# Patient Record
Sex: MALE | Race: BLACK OR AFRICAN AMERICAN | Employment: OTHER | URBAN - METROPOLITAN AREA
[De-identification: names, ages, dates, MRNs, and addresses within clinical notes are randomized per-mention and may not be internally consistent; named-entity substitution may affect disease eponyms.]

---

## 2020-11-16 ENCOUNTER — HOSPITAL ENCOUNTER (EMERGENCY)
Age: 65
Discharge: HOME | End: 2020-11-16
Payer: MEDICARE

## 2020-11-16 ENCOUNTER — APPOINTMENT (OUTPATIENT)
Dept: GENERAL RADIOLOGY | Age: 65
End: 2020-11-16
Attending: FAMILY MEDICINE
Payer: MEDICARE

## 2020-11-16 VITALS
SYSTOLIC BLOOD PRESSURE: 134 MMHG | HEART RATE: 65 BPM | OXYGEN SATURATION: 100 % | TEMPERATURE: 98.7 F | RESPIRATION RATE: 16 BRPM | BODY MASS INDEX: 22.4 KG/M2 | WEIGHT: 160 LBS | HEIGHT: 71 IN | DIASTOLIC BLOOD PRESSURE: 81 MMHG

## 2020-11-16 DIAGNOSIS — S93.602A FOOT SPRAIN, LEFT, INITIAL ENCOUNTER: Primary | ICD-10-CM

## 2020-11-16 PROCEDURE — 99283 EMERGENCY DEPT VISIT LOW MDM: CPT

## 2020-11-16 PROCEDURE — 73630 X-RAY EXAM OF FOOT: CPT

## 2020-11-16 RX ORDER — MELOXICAM 7.5 MG/1
7.5 TABLET ORAL DAILY
Qty: 14 TAB | Refills: 0 | Status: SHIPPED | OUTPATIENT
Start: 2020-11-16 | End: 2020-11-30

## 2020-11-16 NOTE — ED TRIAGE NOTES
[facility] pt stated that he stepped down a step hard and felt some discomfort yesterday; pt stated that he went about his day and today his left foot is having increasing pain

## 2020-11-16 NOTE — ED PROVIDER NOTES
EMERGENCY DEPARTMENT HISTORY AND PHYSICAL EXAM      Date: 11/16/2020  Patient Name: Clem Israel    History of Presenting Illness     Chief Complaint   Patient presents with    Foot Pain       History Provided By: Patient    HPI: Clem sIrael, 72 y.o. male with a past medical history significant for arthritis presents to the ED with cc of sudden onset, gradually worsening, intermittent L foot pain x 2-3 days. Symptoms exacerbated with ambulation and alleviated with ibuprofen. He stepped on a step and felt as if his left foot inverted causing symptoms. He just wants to make sure there is no acute fracture. Works at [employer] and worked all day today. Has been using crutches intermittently for symptomatic relief. No other injuries noted. No overt fall. Has history of arthritis. Denies tobacco, alcohol, illicit drug use. No history of diabetes. Patient denies fever, chills, chest pain, shortness of breath, nausea, vomiting, diarrhea, numbness, tingling, weakness. There are no other complaints, changes, or physical findings at this time. PCP: None    No current facility-administered medications on file prior to encounter. No current outpatient medications on file prior to encounter. Past History     Past Medical History:  No past medical history on file. Past Surgical History:  No past surgical history on file. Family History:  No family history on file. Social History:  Social History     Tobacco Use    Smoking status: Not on file   Substance Use Topics    Alcohol use: Not on file    Drug use: Not on file       Allergies:  No Known Allergies      Review of Systems     Review of Systems   Constitutional: Negative for chills, fatigue and fever. HENT: Negative. Respiratory: Negative for cough, chest tightness, shortness of breath and wheezing. Cardiovascular: Negative for chest pain and palpitations.    Gastrointestinal: Negative for abdominal pain, diarrhea, nausea and vomiting. Genitourinary: Negative for frequency and urgency. Musculoskeletal: Positive for arthralgias (L foot). Negative for back pain, neck pain and neck stiffness. Skin: Negative for rash. Neurological: Negative for dizziness, weakness, light-headedness and headaches. Psychiatric/Behavioral: Negative. All other systems reviewed and are negative. Physical Exam     Physical Exam  Vitals signs and nursing note reviewed. Constitutional:       General: He is not in acute distress. Appearance: Normal appearance. He is not ill-appearing or toxic-appearing. HENT:      Head: Normocephalic and atraumatic. Nose: Nose normal.      Mouth/Throat:      Mouth: Mucous membranes are moist.   Eyes:      General: Lids are normal.      Conjunctiva/sclera:      Right eye: Right conjunctiva is not injected. Left eye: Left conjunctiva is not injected. Neck:      Musculoskeletal: Normal range of motion and neck supple. Cardiovascular:      Rate and Rhythm: Normal rate and regular rhythm. Heart sounds: No murmur. No friction rub. No gallop. Pulmonary:      Effort: Pulmonary effort is normal. No tachypnea or respiratory distress. Breath sounds: Normal breath sounds. No stridor or decreased air movement. Musculoskeletal: Normal range of motion. General: No swelling, tenderness, deformity or signs of injury. Right lower leg: No edema. Left lower leg: No edema. Comments: L foot: Tenderness to lateral aspect of L foot. Flexion/extension/inversion/eversion intact without tenderness, 2+ DP pulse BL, NVI, capillary refill < 3 seconds, sensation intact distally   Skin:     General: Skin is warm. Capillary Refill: Capillary refill takes less than 2 seconds. Neurological:      General: No focal deficit present. Mental Status: He is alert and oriented to person, place, and time. Mental status is at baseline.    Psychiatric:         Mood and Affect: Mood normal. Behavior: Behavior is cooperative. Thought Content: Thought content normal.         Judgment: Judgment normal.         Lab and Diagnostic Study Results     Labs -   No results found for this or any previous visit (from the past 12 hour(s)). Radiologic Studies -   XR Results (most recent):  Results from Hospital Encounter encounter on 11/16/20   XR FOOT LT MIN 3 V    Narrative Left foot, 3 views    No plain film evidence for fracture or dislocation. No erosion. Medical Decision Making   - I am the first provider for this patient. - I reviewed the vital signs, available nursing notes, past medical history, past surgical history, family history and social history. - Initial assessment performed. The patients presenting problems have been discussed, and they are in agreement with the care plan formulated and outlined with them. I have encouraged them to ask questions as they arise throughout their visit. Vital Signs-Reviewed the patient's vital signs. Patient Vitals for the past 12 hrs:   Temp Pulse Resp BP SpO2   11/16/20 1705 98.7 °F (37.1 °C) 65 16 134/81 100 %       Records Reviewed: Nursing Notes and Old Medical Records    The patient presents with L foot pain with a differential diagnosis of fracture, sprain, contusion      ED Course:          Provider Notes (Medical Decision Making):     MDM  Number of Diagnoses or Management Options  Foot sprain, left, initial encounter:   Diagnosis management comments:     51-year-old with left foot injury. X-rays are unremarkable. Patient neurovascularly intact. Has been using crutches on his own. Will provide with ankle stirrup for stability and symptomatic relief. We will also prescribe meloxicam given patient has history of arthritis as well. Advise he not take meloxicam with other NSAIDs which he conveyed understanding. Offered work note which he declined.        Amount and/or Complexity of Data Reviewed  Tests in the radiology section of CPT®: ordered and reviewed  Review and summarize past medical records: yes    Patient Progress  Patient progress: stable           Disposition   Disposition: DC- Adult Discharges: All of the diagnostic tests were reviewed and questions answered. Diagnosis, care plan and treatment options were discussed. The patient understands the instructions and will follow up as directed. The patients results have been reviewed with them. They have been counseled regarding their diagnosis. The patient verbally convey understanding and agreement of the signs, symptoms, diagnosis, treatment and prognosis and additionally agrees to follow up as recommended with their PCP in 24 - 48 hours. They also agree with the care-plan and convey that all of their questions have been answered. I have also put together some discharge instructions for them that include: 1) educational information regarding their diagnosis, 2) how to care for their diagnosis at home, as well a 3) list of reasons why they would want to return to the ED prior to their follow-up appointment, should their condition change. Discharged    DISCHARGE PLAN:  1. There are no discharge medications for this patient. 2.   Follow-up Information     Follow up With Specialties Details Why Contact Info    Primary Care Provider  In 3 days As needed     [facility] EMERGENCY DEPT Emergency Medicine  As needed, If symptoms worsen [address] [id number]  [phone]        3. Return to ED if worse   4. Rx for Meloxicam 7.5mg every day x 14 days    Diagnosis     Clinical Impression:   1. Foot sprain, left, initial encounter        Attestations:    JOSEMANUEL Hull    Please note that this dictation was completed with [employer], the computer voice recognition software. Quite often unanticipated grammatical, syntax, homophones, and other interpretive errors are inadvertently transcribed by the computer software. Please disregard these errors. Please excuse any errors that have escaped final proofreading. Thank you.

## 2023-12-10 ENCOUNTER — APPOINTMENT (OUTPATIENT)
Facility: HOSPITAL | Age: 68
End: 2023-12-10
Payer: MEDICARE

## 2023-12-10 ENCOUNTER — HOSPITAL ENCOUNTER (EMERGENCY)
Facility: HOSPITAL | Age: 68
Discharge: HOME | End: 2023-12-10
Payer: MEDICARE

## 2023-12-10 VITALS
DIASTOLIC BLOOD PRESSURE: 74 MMHG | WEIGHT: 160 LBS | HEIGHT: 70 IN | SYSTOLIC BLOOD PRESSURE: 121 MMHG | TEMPERATURE: 98.3 F | HEART RATE: 61 BPM | OXYGEN SATURATION: 96 % | RESPIRATION RATE: 16 BRPM | BODY MASS INDEX: 22.9 KG/M2

## 2023-12-10 DIAGNOSIS — G89.29 ACUTE EXACERBATION OF CHRONIC LOW BACK PAIN: Primary | ICD-10-CM

## 2023-12-10 DIAGNOSIS — M54.50 ACUTE EXACERBATION OF CHRONIC LOW BACK PAIN: Primary | ICD-10-CM

## 2023-12-10 PROCEDURE — 72110 X-RAY EXAM L-2 SPINE 4/>VWS: CPT

## 2023-12-10 PROCEDURE — 6360000002 HC RX W HCPCS: Performed by: NURSE PRACTITIONER

## 2023-12-10 PROCEDURE — 6370000000 HC RX 637 (ALT 250 FOR IP): Performed by: NURSE PRACTITIONER

## 2023-12-10 PROCEDURE — 72072 X-RAY EXAM THORAC SPINE 3VWS: CPT

## 2023-12-10 PROCEDURE — 96372 THER/PROPH/DIAG INJ SC/IM: CPT

## 2023-12-10 PROCEDURE — 99284 EMERGENCY DEPT VISIT MOD MDM: CPT

## 2023-12-10 RX ORDER — METHOCARBAMOL 750 MG/1
750 TABLET, FILM COATED ORAL 4 TIMES DAILY
Qty: 20 TABLET | Refills: 0 | Status: SHIPPED | OUTPATIENT
Start: 2023-12-10

## 2023-12-10 RX ORDER — METHOCARBAMOL 500 MG/1
750 TABLET, FILM COATED ORAL ONCE
Status: COMPLETED | OUTPATIENT
Start: 2023-12-10 | End: 2023-12-10

## 2023-12-10 RX ORDER — IBUPROFEN 600 MG/1
600 TABLET ORAL 3 TIMES DAILY PRN
Qty: 30 TABLET | Refills: 0 | Status: SHIPPED | OUTPATIENT
Start: 2023-12-10

## 2023-12-10 RX ORDER — KETOROLAC TROMETHAMINE 30 MG/ML
30 INJECTION, SOLUTION INTRAMUSCULAR; INTRAVENOUS ONCE
Status: COMPLETED | OUTPATIENT
Start: 2023-12-10 | End: 2023-12-10

## 2023-12-10 RX ADMIN — METHOCARBAMOL 750 MG: 500 TABLET ORAL at 13:23

## 2023-12-10 RX ADMIN — KETOROLAC TROMETHAMINE 30 MG: 30 INJECTION, SOLUTION INTRAMUSCULAR at 13:23

## 2023-12-10 ASSESSMENT — PAIN - FUNCTIONAL ASSESSMENT
PAIN_FUNCTIONAL_ASSESSMENT: 0-10
PAIN_FUNCTIONAL_ASSESSMENT: 0-10

## 2023-12-10 ASSESSMENT — PAIN SCALES - GENERAL
PAINLEVEL_OUTOF10: 10
PAINLEVEL_OUTOF10: 5

## 2023-12-10 ASSESSMENT — LIFESTYLE VARIABLES
HOW OFTEN DO YOU HAVE A DRINK CONTAINING ALCOHOL: 4 OR MORE TIMES A WEEK
HOW MANY STANDARD DRINKS CONTAINING ALCOHOL DO YOU HAVE ON A TYPICAL DAY: 1 OR 2

## 2023-12-10 ASSESSMENT — PAIN DESCRIPTION - LOCATION: LOCATION: ABDOMEN;BACK
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
## 2023-12-10 NOTE — ED PROVIDER NOTES
Decision Making, Pt Expectation of Test or Treatment.): See ED Course    Patient was given the following medications:  Medications   ketorolac (TORADOL) injection 30 mg (has no administration in time range)   methocarbamol (ROBAXIN) tablet 750 mg (has no administration in time range)       CONSULTS: (Who and What was discussed)  None     Social Determinants affecting Dx or Tx: Patient lacks a PCP. Smoking Cessation: Not Applicable    PROCEDURES   Unless otherwise noted above, none. Procedures      CRITICAL CARE TIME   Patient does not meet Critical Care Time, 0 minutes    FINAL IMPRESSION     1. Acute exacerbation of chronic low back pain          DISPOSITION/PLAN   DISPOSITION      Discharge Note: The patient is stable for discharge home. The signs, symptoms, diagnosis, and discharge instructions have been discussed, understanding conveyed, and agreed upon. The patient is to follow up as recommended or return to ER should their symptoms worsen.       PATIENT REFERRED TO:  Valdez Su, [address]  [phone]    Schedule an appointment as soon as possible for a visit   Orthopedic referral for further management of your back pain    Ene Metcalf MD  [address] [place] [address]  [phone]      PCP referral for follow up and to establish routine regular medical care        DISCHARGE MEDICATIONS:     Medication List        START taking these medications      ibuprofen 600 MG tablet  Commonly known as: ADVIL;MOTRIN  Take 1 tablet by mouth 3 times daily as needed for Pain     methocarbamol 750 MG tablet  Commonly known as: Robaxin-750  Take 1 tablet by mouth 4 times daily               Where to Get Your Medications        These medications were sent to [facility] [phone] [address], [facility] [facility] [address]      Phone: [phone]   ibuprofen 600 MG

## 2023-12-10 NOTE — ED NOTES
Pt not found in TC3. Searched bathroom with no success. Writer waiting if Pt returns before dispo. Provider informed.      Christopher Roy RN  12/10/23 [id number]

## 2024-01-01 ENCOUNTER — APPOINTMENT (OUTPATIENT)
Facility: HOSPITAL | Age: 69
DRG: 377 | End: 2024-01-01
Payer: MEDICARE

## 2024-01-01 ENCOUNTER — ANESTHESIA EVENT (OUTPATIENT)
Facility: HOSPITAL | Age: 69
End: 2024-01-01
Payer: MEDICARE

## 2024-01-01 ENCOUNTER — HOSPITAL ENCOUNTER (INPATIENT)
Facility: HOSPITAL | Age: 69
LOS: 9 days | Discharge: HOME | DRG: 377 | End: 2024-01-10
Attending: EMERGENCY MEDICINE | Admitting: FAMILY MEDICINE
Payer: MEDICARE

## 2024-01-01 ENCOUNTER — ANESTHESIA (OUTPATIENT)
Facility: HOSPITAL | Age: 69
End: 2024-01-01
Payer: MEDICARE

## 2024-01-01 DIAGNOSIS — K92.2 UPPER GI BLEED: Primary | ICD-10-CM
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
## 2024-01-01 PROBLEM — K92.0 HEMATEMESIS: Status: ACTIVE | Noted: 2024-01-01

## 2024-01-01 LAB
ALBUMIN SERPL-MCNC: 2.4 G/DL (ref 3.5–5)
ALBUMIN/GLOB SERPL: 1 (ref 1.1–2.2)
ALP SERPL-CCNC: 35 U/L (ref 45–117)
ALT SERPL-CCNC: 13 U/L (ref 12–78)
ANION GAP SERPL CALC-SCNC: 16 MMOL/L (ref 5–15)
AST SERPL W P-5'-P-CCNC: 9 U/L (ref 15–37)
BASE DEFICIT BLD-SCNC: 12.5 MMOL/L
BASOPHILS # BLD: 0 K/UL (ref 0–0.1)
BASOPHILS # BLD: 0 K/UL (ref 0–0.1)
BASOPHILS NFR BLD: 0 % (ref 0–1)
BASOPHILS NFR BLD: 0 % (ref 0–1)
BILIRUB SERPL-MCNC: <0.1 MG/DL (ref 0.2–1)
BUN SERPL-MCNC: 61 MG/DL (ref 6–20)
BUN/CREAT SERPL: 23 (ref 12–20)
CA-I BLD-MCNC: 1.17 MMOL/L (ref 1.12–1.32)
CA-I BLD-MCNC: 8.2 MG/DL (ref 8.5–10.1)
CHLORIDE BLD-SCNC: 104 MMOL/L (ref 98–107)
CHLORIDE SERPL-SCNC: 108 MMOL/L (ref 97–108)
CO2 BLD-SCNC: 14 MMOL/L
CO2 SERPL-SCNC: 15 MMOL/L (ref 21–32)
CREAT SERPL-MCNC: 2.62 MG/DL (ref 0.7–1.3)
DIFFERENTIAL METHOD BLD: ABNORMAL
DIFFERENTIAL METHOD BLD: ABNORMAL
EOSINOPHIL # BLD: 0 K/UL (ref 0–0.4)
EOSINOPHIL # BLD: 0 K/UL (ref 0–0.4)
EOSINOPHIL NFR BLD: 0 % (ref 0–7)
EOSINOPHIL NFR BLD: 0 % (ref 0–7)
ERYTHROCYTE [DISTWIDTH] IN BLOOD BY AUTOMATED COUNT: 13.6 % (ref 11.5–14.5)
ERYTHROCYTE [DISTWIDTH] IN BLOOD BY AUTOMATED COUNT: 15.2 % (ref 11.5–14.5)
ETHANOL SERPL-MCNC: <10 MG/DL (ref 0–0.08)
GLOBULIN SER CALC-MCNC: 2.4 G/DL (ref 2–4)
GLUCOSE BLD STRIP.AUTO-MCNC: 263 MG/DL (ref 65–100)
GLUCOSE SERPL-MCNC: 239 MG/DL (ref 65–100)
HCO3 BLD-SCNC: 13.9 MMOL/L (ref 19–28)
HCT VFR BLD AUTO: 22.1 % (ref 36.6–50.3)
HCT VFR BLD AUTO: 7.9 % (ref 36.6–50.3)
HGB BLD-MCNC: 2.5 G/DL (ref 12.1–17)
HGB BLD-MCNC: 7.8 G/DL (ref 12.1–17)
IMM GRANULOCYTES # BLD AUTO: 0 K/UL (ref 0–0.04)
IMM GRANULOCYTES # BLD AUTO: 0.1 K/UL (ref 0–0.04)
IMM GRANULOCYTES NFR BLD AUTO: 0 % (ref 0–0.5)
IMM GRANULOCYTES NFR BLD AUTO: 1 % (ref 0–0.5)
INR PPP: 1.2 (ref 0.9–1.1)
LACTATE BLD-SCNC: 14.49 MMOL/L (ref 0.4–2)
LYMPHOCYTES # BLD: 1.5 K/UL (ref 0.8–3.5)
LYMPHOCYTES # BLD: 1.7 K/UL (ref 0.8–3.5)
LYMPHOCYTES NFR BLD: 18 % (ref 12–49)
LYMPHOCYTES NFR BLD: 21 % (ref 12–49)
MCH RBC QN AUTO: 30.5 PG (ref 26–34)
MCH RBC QN AUTO: 32.5 PG (ref 26–34)
MCHC RBC AUTO-ENTMCNC: 31.6 G/DL (ref 30–36.5)
MCHC RBC AUTO-ENTMCNC: 35.3 G/DL (ref 30–36.5)
MCV RBC AUTO: 102.6 FL (ref 80–99)
MCV RBC AUTO: 86.3 FL (ref 80–99)
MONOCYTES # BLD: 0.5 K/UL (ref 0–1)
MONOCYTES # BLD: 0.9 K/UL (ref 0–1)
MONOCYTES NFR BLD: 12 % (ref 5–13)
MONOCYTES NFR BLD: 6 % (ref 5–13)
NEUTS SEG # BLD: 5.2 K/UL (ref 1.8–8)
NEUTS SEG # BLD: 6.3 K/UL (ref 1.8–8)
NEUTS SEG NFR BLD: 67 % (ref 32–75)
NEUTS SEG NFR BLD: 75 % (ref 32–75)
NRBC # BLD: 0.04 K/UL (ref 0–0.01)
NRBC # BLD: 0.1 K/UL (ref 0–0.01)
NRBC BLD-RTO: 0.5 PER 100 WBC
NRBC BLD-RTO: 1.3 PER 100 WBC
PCO2 BLD: 34.1 MMHG (ref 35–45)
PERFORMED BY:: ABNORMAL
PH BLD: 7.22 (ref 7.35–7.45)
PLATELET # BLD AUTO: 134 K/UL (ref 150–400)
PLATELET # BLD AUTO: 146 K/UL (ref 150–400)
PMV BLD AUTO: 10.2 FL (ref 8.9–12.9)
PMV BLD AUTO: 11.3 FL (ref 8.9–12.9)
PO2 BLD: 31 MMHG (ref 75–100)
POTASSIUM BLD-SCNC: 4.6 MMOL/L (ref 3.5–5.5)
POTASSIUM SERPL-SCNC: 4.2 MMOL/L (ref 3.5–5.1)
PROT SERPL-MCNC: 4.8 G/DL (ref 6.4–8.2)
PROTHROMBIN TIME: 15.6 SEC (ref 11.9–14.6)
RBC # BLD AUTO: 0.77 M/UL (ref 4.1–5.7)
RBC # BLD AUTO: 2.56 M/UL (ref 4.1–5.7)
SAO2 % BLD: 48 %
SERVICE CMNT-IMP: ABNORMAL
SODIUM SERPL-SCNC: 139 MMOL/L (ref 136–145)
SPECIMEN SITE: ABNORMAL
WBC # BLD AUTO: 7.8 K/UL (ref 4.1–11.1)
WBC # BLD AUTO: 8.4 K/UL (ref 4.1–11.1)

## 2024-01-01 PROCEDURE — 83605 ASSAY OF LACTIC ACID: CPT

## 2024-01-01 PROCEDURE — 2580000003 HC RX 258: Performed by: FAMILY MEDICINE

## 2024-01-01 PROCEDURE — 86850 RBC ANTIBODY SCREEN: CPT

## 2024-01-01 PROCEDURE — 85610 PROTHROMBIN TIME: CPT

## 2024-01-01 PROCEDURE — 84132 ASSAY OF SERUM POTASSIUM: CPT

## 2024-01-01 PROCEDURE — 85018 HEMOGLOBIN: CPT

## 2024-01-01 PROCEDURE — 96365 THER/PROPH/DIAG IV INF INIT: CPT

## 2024-01-01 PROCEDURE — P9016 RBC LEUKOCYTES REDUCED: HCPCS

## 2024-01-01 PROCEDURE — 87040 BLOOD CULTURE FOR BACTERIA: CPT

## 2024-01-01 PROCEDURE — 88305 TISSUE EXAM BY PATHOLOGIST: CPT

## 2024-01-01 PROCEDURE — 3700000000 HC ANESTHESIA ATTENDED CARE: Performed by: INTERNAL MEDICINE

## 2024-01-01 PROCEDURE — 86900 BLOOD TYPING SEROLOGIC ABO: CPT

## 2024-01-01 PROCEDURE — 70450 CT HEAD/BRAIN W/O DYE: CPT

## 2024-01-01 PROCEDURE — 3600007512: Performed by: INTERNAL MEDICINE

## 2024-01-01 PROCEDURE — 82077 ASSAY SPEC XCP UR&BREATH IA: CPT

## 2024-01-01 PROCEDURE — 1100000000 HC RM PRIVATE

## 2024-01-01 PROCEDURE — 36415 COLL VENOUS BLD VENIPUNCTURE: CPT

## 2024-01-01 PROCEDURE — 6360000002 HC RX W HCPCS: Performed by: INTERNAL MEDICINE

## 2024-01-01 PROCEDURE — 6360000004 HC RX CONTRAST MEDICATION: Performed by: EMERGENCY MEDICINE

## 2024-01-01 PROCEDURE — 6360000002 HC RX W HCPCS: Performed by: NURSE ANESTHETIST, CERTIFIED REGISTERED

## 2024-01-01 PROCEDURE — 2580000003 HC RX 258: Performed by: NURSE ANESTHETIST, CERTIFIED REGISTERED

## 2024-01-01 PROCEDURE — 2580000003 HC RX 258: Performed by: EMERGENCY MEDICINE

## 2024-01-01 PROCEDURE — 80053 COMPREHEN METABOLIC PANEL: CPT

## 2024-01-01 PROCEDURE — 96375 TX/PRO/DX INJ NEW DRUG ADDON: CPT

## 2024-01-01 PROCEDURE — 82330 ASSAY OF CALCIUM: CPT

## 2024-01-01 PROCEDURE — C9113 INJ PANTOPRAZOLE SODIUM, VIA: HCPCS | Performed by: FAMILY MEDICINE

## 2024-01-01 PROCEDURE — 3600007502: Performed by: INTERNAL MEDICINE

## 2024-01-01 PROCEDURE — 2500000003 HC RX 250 WO HCPCS: Performed by: NURSE ANESTHETIST, CERTIFIED REGISTERED

## 2024-01-01 PROCEDURE — 2500000003 HC RX 250 WO HCPCS: Performed by: FAMILY MEDICINE

## 2024-01-01 PROCEDURE — 82947 ASSAY GLUCOSE BLOOD QUANT: CPT

## 2024-01-01 PROCEDURE — 86923 COMPATIBILITY TEST ELECTRIC: CPT

## 2024-01-01 PROCEDURE — C9113 INJ PANTOPRAZOLE SODIUM, VIA: HCPCS | Performed by: EMERGENCY MEDICINE

## 2024-01-01 PROCEDURE — 74177 CT ABD & PELVIS W/CONTRAST: CPT

## 2024-01-01 PROCEDURE — 3E0G8GC INTRODUCTION OF OTHER THERAPEUTIC SUBSTANCE INTO UPPER GI, VIA NATURAL OR ARTIFICIAL OPENING ENDOSCOPIC: ICD-10-PCS | Performed by: INTERNAL MEDICINE

## 2024-01-01 PROCEDURE — 82803 BLOOD GASES ANY COMBINATION: CPT

## 2024-01-01 PROCEDURE — 86901 BLOOD TYPING SEROLOGIC RH(D): CPT

## 2024-01-01 PROCEDURE — 84295 ASSAY OF SERUM SODIUM: CPT

## 2024-01-01 PROCEDURE — 6360000002 HC RX W HCPCS: Performed by: FAMILY MEDICINE

## 2024-01-01 PROCEDURE — 85025 COMPLETE CBC W/AUTO DIFF WBC: CPT

## 2024-01-01 PROCEDURE — 88342 IMHCHEM/IMCYTCHM 1ST ANTB: CPT

## 2024-01-01 PROCEDURE — 2709999900 HC NON-CHARGEABLE SUPPLY: Performed by: INTERNAL MEDICINE

## 2024-01-01 PROCEDURE — 99285 EMERGENCY DEPT VISIT HI MDM: CPT

## 2024-01-01 PROCEDURE — 6360000002 HC RX W HCPCS: Performed by: EMERGENCY MEDICINE

## 2024-01-01 PROCEDURE — 85014 HEMATOCRIT: CPT

## 2024-01-01 PROCEDURE — 0DB68ZX EXCISION OF STOMACH, VIA NATURAL OR ARTIFICIAL OPENING ENDOSCOPIC, DIAGNOSTIC: ICD-10-PCS | Performed by: INTERNAL MEDICINE

## 2024-01-01 PROCEDURE — 3700000001 HC ADD 15 MINUTES (ANESTHESIA): Performed by: INTERNAL MEDICINE

## 2024-01-01 PROCEDURE — 96376 TX/PRO/DX INJ SAME DRUG ADON: CPT

## 2024-01-01 RX ORDER — SODIUM CHLORIDE 9 MG/ML
INJECTION, SOLUTION INTRAVENOUS PRN
Status: DISCONTINUED | OUTPATIENT
Start: 2024-01-01 | End: 2024-01-03 | Stop reason: SDUPTHER

## 2024-01-01 RX ORDER — LORAZEPAM 2 MG/ML
2 INJECTION INTRAMUSCULAR ONCE
Status: DISCONTINUED | OUTPATIENT
Start: 2024-01-01 | End: 2024-01-01

## 2024-01-01 RX ORDER — ONDANSETRON 4 MG/1
4 TABLET, ORALLY DISINTEGRATING ORAL 3 TIMES DAILY PRN
Qty: 9 TABLET | Refills: 0 | Status: SHIPPED | OUTPATIENT
Start: 2024-01-01 | End: 2024-01-04

## 2024-01-01 RX ORDER — MORPHINE SULFATE 4 MG/ML
4 INJECTION, SOLUTION INTRAMUSCULAR; INTRAVENOUS
Status: COMPLETED | OUTPATIENT
Start: 2024-01-01 | End: 2024-01-01

## 2024-01-01 RX ORDER — POLYETHYLENE GLYCOL 3350 17 G/17G
17 POWDER, FOR SOLUTION ORAL DAILY PRN
Status: DISCONTINUED | OUTPATIENT
Start: 2024-01-01 | End: 2024-01-10 | Stop reason: HOSPADM

## 2024-01-01 RX ORDER — ETOMIDATE 2 MG/ML
INJECTION INTRAVENOUS PRN
Status: DISCONTINUED | OUTPATIENT
Start: 2024-01-01 | End: 2024-01-01 | Stop reason: SDUPTHER

## 2024-01-01 RX ORDER — SODIUM CHLORIDE 9 MG/ML
INJECTION, SOLUTION INTRAVENOUS CONTINUOUS PRN
Status: DISCONTINUED | OUTPATIENT
Start: 2024-01-01 | End: 2024-01-01 | Stop reason: SDUPTHER

## 2024-01-01 RX ORDER — SODIUM CHLORIDE 0.9 % (FLUSH) 0.9 %
5-40 SYRINGE (ML) INJECTION PRN
Status: DISCONTINUED | OUTPATIENT
Start: 2024-01-01 | End: 2024-01-10 | Stop reason: HOSPADM

## 2024-01-01 RX ORDER — SODIUM CHLORIDE 9 MG/ML
INJECTION, SOLUTION INTRAVENOUS CONTINUOUS
Status: DISCONTINUED | OUTPATIENT
Start: 2024-01-01 | End: 2024-01-07

## 2024-01-01 RX ORDER — SUCCINYLCHOLINE/SOD CL,ISO/PF 200MG/10ML
SYRINGE (ML) INTRAVENOUS PRN
Status: DISCONTINUED | OUTPATIENT
Start: 2024-01-01 | End: 2024-01-01 | Stop reason: SDUPTHER

## 2024-01-01 RX ORDER — 0.9 % SODIUM CHLORIDE 0.9 %
1000 INTRAVENOUS SOLUTION INTRAVENOUS ONCE
Status: COMPLETED | OUTPATIENT
Start: 2024-01-01 | End: 2024-01-01

## 2024-01-01 RX ORDER — EPINEPHRINE 1 MG/ML(1)
AMPUL (ML) INJECTION PRN
Status: DISCONTINUED | OUTPATIENT
Start: 2024-01-01 | End: 2024-01-01 | Stop reason: ALTCHOICE

## 2024-01-01 RX ORDER — PANTOPRAZOLE SODIUM 40 MG/10ML
40 INJECTION, POWDER, LYOPHILIZED, FOR SOLUTION INTRAVENOUS 2 TIMES DAILY
Status: DISCONTINUED | OUTPATIENT
Start: 2024-01-01 | End: 2024-01-02

## 2024-01-01 RX ORDER — ACETAMINOPHEN 325 MG/1
650 TABLET ORAL EVERY 6 HOURS PRN
Status: DISCONTINUED | OUTPATIENT
Start: 2024-01-01 | End: 2024-01-10 | Stop reason: HOSPADM

## 2024-01-01 RX ORDER — ONDANSETRON 2 MG/ML
4 INJECTION INTRAMUSCULAR; INTRAVENOUS ONCE
Status: COMPLETED | OUTPATIENT
Start: 2024-01-01 | End: 2024-01-01

## 2024-01-01 RX ORDER — OCTREOTIDE ACETATE 50 UG/ML
50 INJECTION, SOLUTION INTRAVENOUS; SUBCUTANEOUS ONCE
Status: COMPLETED | OUTPATIENT
Start: 2024-01-01 | End: 2024-01-01

## 2024-01-01 RX ORDER — SODIUM CHLORIDE 9 MG/ML
INJECTION, SOLUTION INTRAVENOUS PRN
Status: DISCONTINUED | OUTPATIENT
Start: 2024-01-01 | End: 2024-01-10 | Stop reason: HOSPADM

## 2024-01-01 RX ORDER — SODIUM CHLORIDE 9 MG/ML
INJECTION, SOLUTION INTRAVENOUS PRN
Status: DISCONTINUED | OUTPATIENT
Start: 2024-01-01 | End: 2024-01-01

## 2024-01-01 RX ORDER — ONDANSETRON 2 MG/ML
4 INJECTION INTRAMUSCULAR; INTRAVENOUS EVERY 6 HOURS PRN
Status: DISCONTINUED | OUTPATIENT
Start: 2024-01-01 | End: 2024-01-10 | Stop reason: HOSPADM

## 2024-01-01 RX ORDER — SODIUM CHLORIDE 0.9 % (FLUSH) 0.9 %
5-40 SYRINGE (ML) INJECTION EVERY 12 HOURS SCHEDULED
Status: DISCONTINUED | OUTPATIENT
Start: 2024-01-01 | End: 2024-01-10 | Stop reason: HOSPADM

## 2024-01-01 RX ORDER — ONDANSETRON 4 MG/1
4 TABLET, ORALLY DISINTEGRATING ORAL EVERY 8 HOURS PRN
Status: DISCONTINUED | OUTPATIENT
Start: 2024-01-01 | End: 2024-01-10 | Stop reason: HOSPADM

## 2024-01-01 RX ORDER — ACETAMINOPHEN 650 MG/1
650 SUPPOSITORY RECTAL EVERY 6 HOURS PRN
Status: DISCONTINUED | OUTPATIENT
Start: 2024-01-01 | End: 2024-01-10 | Stop reason: HOSPADM

## 2024-01-01 RX ADMIN — SODIUM CHLORIDE 1000 ML: 9 INJECTION, SOLUTION INTRAVENOUS at 15:52

## 2024-01-01 RX ADMIN — OCTREOTIDE ACETATE 50 MCG/HR: 1000 INJECTION, SOLUTION INTRAVENOUS; SUBCUTANEOUS at 08:55

## 2024-01-01 RX ADMIN — OCTREOTIDE ACETATE 50 MCG: 50 INJECTION, SOLUTION INTRAVENOUS; SUBCUTANEOUS at 08:54

## 2024-01-01 RX ADMIN — SODIUM BICARBONATE 50 MEQ: 84 INJECTION, SOLUTION INTRAVENOUS at 15:52

## 2024-01-01 RX ADMIN — ONDANSETRON 4 MG: 2 INJECTION INTRAMUSCULAR; INTRAVENOUS at 08:05

## 2024-01-01 RX ADMIN — SODIUM CHLORIDE, PRESERVATIVE FREE 10 ML: 5 INJECTION INTRAVENOUS at 23:44

## 2024-01-01 RX ADMIN — MORPHINE SULFATE 4 MG: 4 INJECTION, SOLUTION INTRAMUSCULAR; INTRAVENOUS at 09:54

## 2024-01-01 RX ADMIN — SODIUM CHLORIDE: 9 INJECTION, SOLUTION INTRAVENOUS at 12:53

## 2024-01-01 RX ADMIN — ETOMIDATE INJECTION 14 MG: 2 SOLUTION INTRAVENOUS at 13:01

## 2024-01-01 RX ADMIN — Medication 140 MG: at 13:01

## 2024-01-01 RX ADMIN — PANTOPRAZOLE SODIUM 40 MG: 40 INJECTION, POWDER, FOR SOLUTION INTRAVENOUS at 23:44

## 2024-01-01 RX ADMIN — ONDANSETRON 4 MG: 2 INJECTION INTRAMUSCULAR; INTRAVENOUS at 13:05

## 2024-01-01 RX ADMIN — PANTOPRAZOLE SODIUM 40 MG: 40 INJECTION, POWDER, FOR SOLUTION INTRAVENOUS at 08:24

## 2024-01-01 RX ADMIN — SODIUM CHLORIDE: 9 INJECTION, SOLUTION INTRAVENOUS at 15:52

## 2024-01-01 RX ADMIN — PROPOFOL 30 MG: 10 INJECTION, EMULSION INTRAVENOUS at 13:07

## 2024-01-01 RX ADMIN — IOPAMIDOL 100 ML: 755 INJECTION, SOLUTION INTRAVENOUS at 08:55

## 2024-01-01 RX ADMIN — OCTREOTIDE ACETATE 50 MCG/HR: 1000 INJECTION, SOLUTION INTRAVENOUS; SUBCUTANEOUS at 22:42

## 2024-01-01 RX ADMIN — PROPOFOL 40 MG: 10 INJECTION, EMULSION INTRAVENOUS at 13:10

## 2024-01-01 RX ADMIN — CEFTRIAXONE SODIUM 1000 MG: 1 INJECTION, POWDER, FOR SOLUTION INTRAMUSCULAR; INTRAVENOUS at 08:06

## 2024-01-01 RX ADMIN — PROPOFOL 30 MG: 10 INJECTION, EMULSION INTRAVENOUS at 13:04

## 2024-01-01 RX ADMIN — SODIUM CHLORIDE, PRESERVATIVE FREE 10 ML: 5 INJECTION INTRAVENOUS at 15:53

## 2024-01-01 ASSESSMENT — LIFESTYLE VARIABLES
HOW MANY STANDARD DRINKS CONTAINING ALCOHOL DO YOU HAVE ON A TYPICAL DAY: PATIENT DOES NOT DRINK
SMOKING_STATUS: 1
HOW OFTEN DO YOU HAVE A DRINK CONTAINING ALCOHOL: NEVER

## 2024-01-01 ASSESSMENT — PAIN - FUNCTIONAL ASSESSMENT
PAIN_FUNCTIONAL_ASSESSMENT: NONE - DENIES PAIN
PAIN_FUNCTIONAL_ASSESSMENT: NONE - DENIES PAIN

## 2024-01-01 ASSESSMENT — PAIN SCALES - GENERAL: PAINLEVEL_OUTOF10: 10

## 2024-01-01 NOTE — ANESTHESIA POSTPROCEDURE EVALUATION
Department of Anesthesiology  Postprocedure Note    Patient: George West  MRN: [id number]  YOB: 1955  Date of evaluation: 1/1/2024    Procedure Summary       Date: 01/01/24 Room / Location: [facility] ENDO TRAVEL / [facility] ENDOSCOPY    Anesthesia Start: 1253 Anesthesia Stop: 1333    Procedures:       EGD ESOPHAGOGASTRODUODENOSCOPY (Upper GI Region)      EGD BIOPSY (Upper GI Region) Diagnosis:       Gastrointestinal hemorrhage, unspecified gastrointestinal hemorrhage type      (Gastrointestinal hemorrhage, unspecified gastrointestinal hemorrhage type [K92.2])    Surgeons: Linh Olivier MD Responsible Provider: Marcus Ortega Jr., MD    Anesthesia Type: general ASA Status: 4 - Emergent            Anesthesia Type: No value filed.    Edison Phase I:      Edison Phase II:      Anesthesia Post Evaluation    Patient location during evaluation: ED  Patient participation: complete - patient participated  Level of consciousness: responsive to verbal stimuli  Pain score: 0  Airway patency: patent  Nausea & Vomiting: no vomiting and no nausea  Cardiovascular status: hemodynamically stable  Respiratory status: acceptable, nasal cannula and airway suctioned  Hydration status: stable    No notable events documented.

## 2024-01-01 NOTE — ED PROVIDER NOTES
[facility] EMERGENCY DEPT  EMERGENCY DEPARTMENT HISTORY AND PHYSICAL EXAM      Date: 1/1/2024  Patient Name: George West  MRN: [id number]  YOB: 1955  Date of evaluation: 1/1/2024  Provider: Marcus Up DO   Note Started: 9:59 AM EST 1/1/24    HISTORY OF PRESENT ILLNESS     Chief Complaint   Patient presents with    Emesis    Diarrhea    Fall       History Provided By: Patient    HPI: George West is a 68 y.o. male with no reported past medical history presenting to the emergency department for evaluation of head injury status post mechanical fall.  Patient states that he was trying to use the bathroom this morning when he subsequently fell and hit his head on the ground.  There was no loss of consciousness.  Triage note reports nausea, vomiting, diarrhea, however patient does not mention this during my history or physical exam.    PAST MEDICAL HISTORY   Past Medical History:  Past Medical History:   Diagnosis Date    Asthma        Past Surgical History:  History reviewed. No pertinent surgical history.    Family History:  History reviewed. No pertinent family history.    Social History:  Social History     Tobacco Use    Smoking status: Every Day     Current packs/day: 0.50     Types: Cigarettes    Smokeless tobacco: Never   Substance Use Topics    Alcohol use: Yes     Alcohol/week: 1.0 standard drink of alcohol     Types: 1 Cans of beer per week    Drug use: Never       Allergies:  No Known Allergies    PCP: No primary care provider on file.    Current Meds:   Current Facility-Administered Medications   Medication Dose Route Frequency Provider Last Rate Last Admin    octreotide (SANDOSTATIN) 500 mcg in sodium chloride 0.9 % 100 mL infusion  50 mcg/hr IntraVENous Continuous Marcus Up DO 10 mL/hr at 01/01/24 0855 50 mcg/hr at 01/01/24 0855    pantoprazole (PROTONIX) injection 40 mg  40 mg IntraVENous BID Marcus Up DO   40 mg at 01/01/24 0824    0.9 % sodium chloride infusion
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
## 2024-01-01 NOTE — ED NOTES
Pt placed on continuous cardiac monitoring, pulse ox, and blood pressure monitoring at this time.

## 2024-01-01 NOTE — ED NOTES
RN called number patient provided, states ready for discharge. States will come pick patient up and transport home.

## 2024-01-01 NOTE — ED TRIAGE NOTES
Presents with EMS c/o diarrhea and vomiting x3 months; patient states he fell and hit his left side of face on floor tonight    SpO2 low 90s en route

## 2024-01-01 NOTE — DISCHARGE INSTRUCTIONS
Thank you!  Thank you for allowing me to care for you in the emergency department. It is my goal to provide you with excellent care. If you have not received excellent quality care, please ask to speak to the nurse manager. Please fill out the survey that will come to you by mail or email since we listen to your feedback!     Below you will find a list of your tests from today's visit.  Should you have any questions, please do not hesitate to call the emergency department.    Labs  No results found for this or any previous visit (from the past 12 hour(s)).    Radiologic Studies  CT HEAD WO CONTRAST   Final Result   No acute findings.        ------------------------------------------------------------------------------------------------------------  The exam and treatment you received in the Emergency Department were for an urgent problem and are not intended as complete care. It is important that you follow-up with a doctor, nurse practitioner, or physician assistant to:  (1) confirm your diagnosis,  (2) re-evaluation of changes in your illness and treatment, and  (3) for ongoing care. Please take your discharge instructions with you when you go to your follow-up appointment.     If you have any problem arranging a follow-up appointment, contact the Emergency Department.  If your symptoms become worse or you do not improve as expected and you are unable to reach your health care provider, please return to the Emergency Department. We are available 24 hours a day.     If a prescription has been provided, please have it filled as soon as possible to prevent a delay in treatment. If you have any questions or reservations about taking the medication due to side effects or interactions with other medications, please call your primary care provider or contact the ER.         Discharge Instructions       PATIENT ID: George West  MRN: [id number]   YOB: 1955    DATE OF ADMISSION: 1/1/2024  DATE OF

## 2024-01-01 NOTE — H&P
History and Physical    NAME:   George West   :  1955   MRN:  [id number]     Date/Time: 2024 11:17 AM    Patient PCP: No primary care provider on file.  ______________________________________________________________________       Subjective:     CHIEF COMPLAINT:   Hematemesis and rectal        HISTORY OF PRESENT ILLNESS:       Patient is a 68 y.o. year old male history of of alcohol dependency came to emergency room complaining of hematemesis and rectal bleed  According to the patient patient drinking alcohol every single day  Black stool for the last 3 months and bleeding rectally since last night and having hematemesis    When I saw the patient resting the bed hemoglobin came back 2.5    I ordered 4 unit of stat blood transfusion    GI was consulted by the ER physician for emergent scope    Patient's sister at the bedside  For the blood work shows acute kidney injury with metabolic acidosis    Patient was started on a octreotide and Protonix    Patient admitted to ICU for further workup and    Patient on meloxicam and Motrin at home    Past Medical History:   Diagnosis Date    Asthma         History reviewed. No pertinent surgical history.    Social History     Tobacco Use    Smoking status: Every Day     Current packs/day: 0.50     Types: Cigarettes    Smokeless tobacco: Never   Substance Use Topics    Alcohol use: Yes     Alcohol/week: 1.0 standard drink of alcohol     Types: 1 Cans of beer per week        History reviewed. No pertinent family history.    No Known Allergies     Prior to Admission medications    Medication Sig Start Date End Date Taking? Authorizing Provider   ondansetron (ZOFRAN-ODT) 4 MG disintegrating tablet Take 1 tablet by mouth 3 times daily as needed for Nausea or Vomiting 24 Yes Marcus Up,    ibuprofen (ADVIL;MOTRIN) 600 MG tablet Take 1 tablet by mouth 3 times daily as needed for Pain 12/10/23   Elida Shultz, APRN - NP   methocarbamol  Q24H, Brando Mccarty MD    Current Outpatient Medications:     ondansetron (ZOFRAN-ODT) 4 MG disintegrating tablet, Take 1 tablet by mouth 3 times daily as needed for Nausea or Vomiting, Disp: 9 tablet, Rfl: 0    ibuprofen (ADVIL;MOTRIN) 600 MG tablet, Take 1 tablet by mouth 3 times daily as needed for Pain, Disp: 30 tablet, Rfl: 0    methocarbamol (ROBAXIN-750) 750 MG tablet, Take 1 tablet by mouth 4 times daily, Disp: 20 tablet, Rfl: 0     I spent critical care time 45 minutes independent      ________________________________________________________________________    Signed: Brando Mccarty MD

## 2024-01-01 NOTE — ANESTHESIA PRE PROCEDURE
Department of Anesthesiology  Preprocedure Note       Name:  George West   Age:  68 y.o.  :  1955                                          MRN:  [id number]         Date:  2024      Surgeon: Surgeon(s):  Linh Olivier MD    Procedure: Procedure(s):  EGD ESOPHAGOGASTRODUODENOSCOPY    Medications prior to admission:   Prior to Admission medications    Medication Sig Start Date End Date Taking? Authorizing Provider   ondansetron (ZOFRAN-ODT) 4 MG disintegrating tablet Take 1 tablet by mouth 3 times daily as needed for Nausea or Vomiting 24 Yes Marcus Up,    ibuprofen (ADVIL;MOTRIN) 600 MG tablet Take 1 tablet by mouth 3 times daily as needed for Pain 12/10/23   Elida Shultz APRN - NP   methocarbamol (ROBAXIN-750) 750 MG tablet Take 1 tablet by mouth 4 times daily 12/10/23   Elida Shultz APRN - NP       Current medications:    Current Facility-Administered Medications   Medication Dose Route Frequency Provider Last Rate Last Admin   • octreotide (SANDOSTATIN) 500 mcg in sodium chloride 0.9 % 100 mL infusion  50 mcg/hr IntraVENous Continuous Brando Mccarty MD 10 mL/hr at 24 0855 50 mcg/hr at 24 0855   • pantoprazole (PROTONIX) injection 40 mg  40 mg IntraVENous BID Brando Mccarty MD   40 mg at 24 0824   • 0.9 % sodium chloride infusion   IntraVENous PRN Brando Mccarty MD       • 0.9 % sodium chloride infusion   IntraVENous Continuous Brando Mccarty MD       • sodium chloride flush 0.9 % injection 5-40 mL  5-40 mL IntraVENous 2 times per day Brando Mccarty MD       • sodium chloride flush 0.9 % injection 5-40 mL  5-40 mL IntraVENous PRN Brando Mccarty MD       • 0.9 % sodium chloride infusion   IntraVENous PRN Brando Mccarty MD       • ondansetron (ZOFRAN-ODT) disintegrating tablet 4 mg  4 mg Oral Q8H PRN Brando Mccarty MD        Or   • ondansetron (ZOFRAN) injection 4 mg  4 mg IntraVENous Q6H PRN Brando Mccarty MD       •

## 2024-01-02 LAB
ALBUMIN SERPL-MCNC: 2.4 G/DL (ref 3.5–5)
ALBUMIN/GLOB SERPL: 1 (ref 1.1–2.2)
ALP SERPL-CCNC: 34 U/L (ref 45–117)
ALT SERPL-CCNC: 17 U/L (ref 12–78)
ANION GAP SERPL CALC-SCNC: 3 MMOL/L (ref 5–15)
AST SERPL W P-5'-P-CCNC: 19 U/L (ref 15–37)
BASOPHILS # BLD: 0 K/UL (ref 0–0.1)
BASOPHILS NFR BLD: 0 % (ref 0–1)
BILIRUB SERPL-MCNC: <0.1 MG/DL (ref 0.2–1)
BUN SERPL-MCNC: 44 MG/DL (ref 6–20)
BUN/CREAT SERPL: 26 (ref 12–20)
CA-I BLD-MCNC: 7.5 MG/DL (ref 8.5–10.1)
CHLORIDE SERPL-SCNC: 114 MMOL/L (ref 97–108)
CO2 SERPL-SCNC: 26 MMOL/L (ref 21–32)
CREAT SERPL-MCNC: 1.72 MG/DL (ref 0.7–1.3)
DIFFERENTIAL METHOD BLD: ABNORMAL
EOSINOPHIL # BLD: 0 K/UL (ref 0–0.4)
EOSINOPHIL NFR BLD: 0 % (ref 0–7)
ERYTHROCYTE [DISTWIDTH] IN BLOOD BY AUTOMATED COUNT: 15.8 % (ref 11.5–14.5)
GLOBULIN SER CALC-MCNC: 2.5 G/DL (ref 2–4)
GLUCOSE SERPL-MCNC: 115 MG/DL (ref 65–100)
HCT VFR BLD AUTO: 19.2 % (ref 36.6–50.3)
HCT VFR BLD AUTO: 22.9 % (ref 36.6–50.3)
HCT VFR BLD AUTO: 25.2 % (ref 36.6–50.3)
HCT VFR BLD AUTO: 25.3 % (ref 36.6–50.3)
HGB BLD-MCNC: 6.8 G/DL (ref 12.1–17)
HGB BLD-MCNC: 7.7 G/DL (ref 12.1–17)
HGB BLD-MCNC: 8.6 G/DL (ref 12.1–17)
HGB BLD-MCNC: 8.6 G/DL (ref 12.1–17)
IMM GRANULOCYTES # BLD AUTO: 0 K/UL (ref 0–0.04)
IMM GRANULOCYTES NFR BLD AUTO: 0 % (ref 0–0.5)
LACTATE BLD-SCNC: 0.56 MMOL/L (ref 0.4–2)
LYMPHOCYTES # BLD: 2 K/UL (ref 0.8–3.5)
LYMPHOCYTES NFR BLD: 34 % (ref 12–49)
MCH RBC QN AUTO: 29.3 PG (ref 26–34)
MCHC RBC AUTO-ENTMCNC: 34.1 G/DL (ref 30–36.5)
MCV RBC AUTO: 85.7 FL (ref 80–99)
MONOCYTES # BLD: 0.5 K/UL (ref 0–1)
MONOCYTES NFR BLD: 9 % (ref 5–13)
NEUTS SEG # BLD: 3.3 K/UL (ref 1.8–8)
NEUTS SEG NFR BLD: 57 % (ref 32–75)
NRBC # BLD: 0.11 K/UL (ref 0–0.01)
NRBC BLD-RTO: 1.9 PER 100 WBC
PERFORMED BY:: NORMAL
PLATELET # BLD AUTO: 139 K/UL (ref 150–400)
PMV BLD AUTO: 10.5 FL (ref 8.9–12.9)
POTASSIUM SERPL-SCNC: 4.4 MMOL/L (ref 3.5–5.1)
PROT SERPL-MCNC: 4.9 G/DL (ref 6.4–8.2)
RBC # BLD AUTO: 2.94 M/UL (ref 4.1–5.7)
SODIUM SERPL-SCNC: 143 MMOL/L (ref 136–145)
WBC # BLD AUTO: 5.9 K/UL (ref 4.1–11.1)

## 2024-01-02 PROCEDURE — 6370000000 HC RX 637 (ALT 250 FOR IP): Performed by: INTERNAL MEDICINE

## 2024-01-02 PROCEDURE — 36415 COLL VENOUS BLD VENIPUNCTURE: CPT

## 2024-01-02 PROCEDURE — 80053 COMPREHEN METABOLIC PANEL: CPT

## 2024-01-02 PROCEDURE — 2580000003 HC RX 258: Performed by: FAMILY MEDICINE

## 2024-01-02 PROCEDURE — P9016 RBC LEUKOCYTES REDUCED: HCPCS

## 2024-01-02 PROCEDURE — 1100000000 HC RM PRIVATE

## 2024-01-02 PROCEDURE — C9113 INJ PANTOPRAZOLE SODIUM, VIA: HCPCS | Performed by: FAMILY MEDICINE

## 2024-01-02 PROCEDURE — 85025 COMPLETE CBC W/AUTO DIFF WBC: CPT

## 2024-01-02 PROCEDURE — 83605 ASSAY OF LACTIC ACID: CPT

## 2024-01-02 PROCEDURE — 6360000002 HC RX W HCPCS: Performed by: FAMILY MEDICINE

## 2024-01-02 RX ORDER — PANTOPRAZOLE SODIUM 40 MG/1
40 TABLET, DELAYED RELEASE ORAL
Status: COMPLETED | OUTPATIENT
Start: 2024-01-02 | End: 2024-01-05

## 2024-01-02 RX ORDER — SODIUM CHLORIDE 9 MG/ML
INJECTION, SOLUTION INTRAVENOUS PRN
Status: DISCONTINUED | OUTPATIENT
Start: 2024-01-02 | End: 2024-01-03 | Stop reason: SDUPTHER

## 2024-01-02 RX ADMIN — CEFTRIAXONE SODIUM 1000 MG: 1 INJECTION, POWDER, FOR SOLUTION INTRAMUSCULAR; INTRAVENOUS at 07:55

## 2024-01-02 RX ADMIN — SODIUM CHLORIDE, PRESERVATIVE FREE 10 ML: 5 INJECTION INTRAVENOUS at 07:56

## 2024-01-02 RX ADMIN — SODIUM CHLORIDE: 9 INJECTION, SOLUTION INTRAVENOUS at 22:00

## 2024-01-02 RX ADMIN — PANTOPRAZOLE SODIUM 40 MG: 40 TABLET, DELAYED RELEASE ORAL at 17:13

## 2024-01-02 RX ADMIN — SODIUM CHLORIDE: 9 INJECTION, SOLUTION INTRAVENOUS at 07:55

## 2024-01-02 RX ADMIN — SODIUM CHLORIDE, PRESERVATIVE FREE 10 ML: 5 INJECTION INTRAVENOUS at 20:42

## 2024-01-02 RX ADMIN — PANTOPRAZOLE SODIUM 40 MG: 40 INJECTION, POWDER, FOR SOLUTION INTRAVENOUS at 07:55

## 2024-01-02 NOTE — ED NOTES
Fifth blood transfusion started, patient tolerating well and increased rate to 200ml/hr.   Denies any pain, resting comfortable. Output documented.

## 2024-01-02 NOTE — PROGRESS NOTES
General Daily Progress Note      Patient Name:   George West       YOB: 1955       Age:  68 y.o.      Admit Date: 1/1/2024      Subjective:        Patient is a 68 y.o. year old male history of of alcohol dependency came to emergency room complaining of hematemesis and rectal bleed  According to the patient patient drinking alcohol every single day  Black stool for the last 3 months and bleeding rectally since last night and having hematemesis     When I saw the patient resting the bed hemoglobin came back 2.5     I ordered 4 unit of stat blood transfusion     GI was consulted by the ER physician for emergent scope     Patient's sister at the bedside  For the blood work shows acute kidney injury with metabolic acidosis     Patient was started on a octreotide and Protonix     Patient admitted to ICU for further workup and     Patient on meloxicam and Motrin at home    1/2    Patient have endoscopy done shows gastritis and oozing duodenal ulcer             Objective:     Vitals:    01/02/24 0730   BP: 107/60   Pulse: 56   Resp: 20   Temp: 98.8 °F (37.1 °C)   SpO2: 99%        Recent Results (from the past 24 hour(s))   CBC with Auto Differential    Collection Time: 01/01/24  7:20 PM   Result Value Ref Range    WBC 7.8 4.1 - 11.1 K/uL    RBC 2.56 (L) 4.10 - 5.70 M/uL    Hemoglobin 7.8 (L) 12.1 - 17.0 g/dL    Hematocrit 22.1 (L) 36.6 - 50.3 %    MCV 86.3 80.0 - 99.0 FL    MCH 30.5 26.0 - 34.0 PG    MCHC 35.3 30.0 - 36.5 g/dL    RDW 15.2 (H) 11.5 - 14.5 %    Platelets 134 (L) 150 - 400 K/uL    MPV 11.3 8.9 - 12.9 FL    Nucleated RBCs 1.3 (H) 0.0  WBC    nRBC 0.10 (H) 0.00 - 0.01 K/uL    Neutrophils % 67 32 - 75 %    Lymphocytes % 21 12 - 49 %    Monocytes % 12 5 - 13 %    Eosinophils % 0 0 - 7 %    Basophils % 0 0 - 1 %    Immature Granulocytes 0 0 - 0.5 %    Neutrophils Absolute 5.2 1.8 - 8.0 K/UL    Lymphocytes Absolute 1.7 0.8 - 3.5 K/UL    Monocytes Absolute 0.9 0.0 - 1.0 K/UL

## 2024-01-02 NOTE — PROGRESS NOTES
4 Eyes Skin Assessment     NAME:  George West  YOB: 1955  MEDICAL RECORD NUMBER:  [id number]    The patient is being assessed for  Admission    I agree that at least one RN has performed a thorough Head to Toe Skin Assessment on the patient. ALL assessment sites listed below have been assessed.      Areas assessed by both nurses:    Head, Face, Ears, Shoulders, Back, Chest, Arms, Elbows, Hands, Sacrum. Buttock, Coccyx, Ischium, and Legs. Feet and Heels        Does the Patient have a Wound? Yes wound(s) were present on assessment. LDA wound assessment was Initiated and completed by RN       Vasyl Prevention initiated by RN: No  Wound Care Orders initiated by RN: No    Pressure Injury (Stage 3,4, Unstageable, DTI, NWPT, and Complex wounds) if present, place Wound referral order by RN under : No    New Ostomies, if present place, Ostomy referral order under : No     Nurse 1 eSignature: Electronically signed by Denisha Kerr RN on 1/2/24 at 3:15 PM EST    **SHARE this note so that the co-signing nurse can place an eSignature**    Nurse 2 eSignature: Electronically signed by Ana Rodrigues RN on 1/2/24 at 5:10 PM EST
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
## 2024-01-02 NOTE — ED NOTES
ED TO INPATIENT SBAR HANDOFF    Patient Name: George West   Preferred Name: George  : 1955  68 y.o.   Family/Caregiver Present: no   Code Status Order: Full Code  PO Status: NPO:No  Telemetry Order:   C-SSRS: Risk of Suicide: No Risk  Sitter no   Restraints:     Sepsis Risk Score Sepsis Risk Score: 0.84    Situation  Chief Complaint   Patient presents with    Emesis    Diarrhea    Fall     Brief Description of Patient's Condition: pt has had 4 transfusions of blood for low hgb, pt alert oriented and being admitted for upper gi blee.   Mental Status: oriented, alert, coherent, logical, and thought processes intact  Arrived from:Home  Imaging:   CT CHEST ABDOMEN PELVIS W CONTRAST Additional Contrast? None   Final Result   Addendum (preliminary)    Addendum: Addendum: Upon review, the stomach is distended with mixed    densities   including air, low density fluid, soft tissue density at 28 2, and    hyperdensity   in the region of the GE junction (image 60). This is concerning for a    bleeding   gastroesophageal varix (coronal image 53) in this clinical setting.      Final   No acute pulmonary process seen.   Innumerable subcentimeter renal hypodensities, not further characterized.   Cystitis      CT HEAD WO CONTRAST   Final Result   No acute findings.        Abnormal labs:   Abnormal Labs Reviewed   COMPREHENSIVE METABOLIC PANEL - Abnormal; Notable for the following components:       Result Value    CO2 15 (*)     Anion Gap 16 (*)     Glucose 239 (*)     BUN 61 (*)     Creatinine 2.62 (*)     Bun/Cre Ratio 23 (*)     Est, Glom Filt Rate 26 (*)     Calcium 8.2 (*)     Total Bilirubin <0.1 (*)     AST 9 (*)     Alk Phosphatase 35 (*)     Total Protein 4.8 (*)     Albumin 2.4 (*)     Albumin/Globulin Ratio 1.0 (*)     All other components within normal limits   PROTIME-INR - Abnormal; Notable for the following components:    Protime 15.6 (*)     INR 1.2 (*)     All other components within normal

## 2024-01-02 NOTE — PLAN OF CARE
Problem: Discharge Planning  Goal: Discharge to home or other facility with appropriate resources  1/2/2024 1726 by Denisha Kerr RN  Outcome: Progressing  1/2/2024 1726 by Denisha Kerr RN  Outcome: Progressing     Problem: Safety - Adult  Goal: Free from fall injury  1/2/2024 1726 by Denisha Kerr RN  Outcome: Progressing  1/2/2024 1726 by Denisha Kerr RN  Outcome: Progressing     Problem: Skin/Tissue Integrity - Adult  Goal: Skin integrity remains intact  1/2/2024 1726 by Denisha Kerr RN  Outcome: Progressing  1/2/2024 1726 by Denisha Kerr RN  Reactivated  Goal: Incisions, wounds, or drain sites healing without S/S of infection  1/2/2024 1726 by Denisha Kerr RN  Outcome: Progressing  1/2/2024 1726 by Denisha Kerr RN  Reactivated  Goal: Oral mucous membranes remain intact  1/2/2024 1726 by Denisha Kerr RN  Outcome: Progressing  1/2/2024 1726 by Denisha Kerr RN  Reactivated     Problem: Gastrointestinal - Adult  Goal: Minimal or absence of nausea and vomiting  1/2/2024 1726 by Denisha Kerr RN  Outcome: Progressing  1/2/2024 1726 by Denisha Kerr RN  Reactivated  Goal: Maintains or returns to baseline bowel function  1/2/2024 1726 by Denisha Kerr RN  Outcome: Progressing  1/2/2024 1726 by Denisha Kerr RN  Reactivated  Goal: Maintains adequate nutritional intake  1/2/2024 1726 by Denisha Kerr RN  Outcome: Progressing  1/2/2024 1726 by Denisha Kerr RN  Reactivated     Problem: Hematologic - Adult  Goal: Maintains hematologic stability  1/2/2024 1726 by Denisha Kerr RN  Outcome: Progressing  1/2/2024 1726 by Denisha Kerr RN  Reactivated

## 2024-01-02 NOTE — ED NOTES
Dr. Mccarty states if next h/h is stable patient can be downgraded to tele/remote tele medical instead of ICU.

## 2024-01-02 NOTE — CARE COORDINATION
01/02/24 1350   Service Assessment   Patient Orientation Alert and Oriented   Cognition Alert   History Provided By Patient   Primary Caregiver Self   Accompanied By/Relationship Pt alone during interview.   Support Systems Family Members   Patient's Healthcare Decision Maker is: Legal Next of Kin   PCP Verified by CM Yes  (Bentley Spann -  seen Dec. 10th.)   Last Visit to PCP Within last 3 months   Prior Functional Level Independent in ADLs/IADLs   Current Functional Level Independent in ADLs/IADLs   Can patient return to prior living arrangement Yes   Ability to make needs known: Good   Family able to assist with home care needs: Yes   Would you like for me to discuss the discharge plan with any other family members/significant others, and if so, who? Yes  (Sister Queen Christin)   Financial Resources Medicare   Community Resources None   Social/Functional History   Lives With Family  (Lives with sister.)   Type of Home House   Home Layout One level   Home Access Stairs to enter without rails   Entrance Stairs - Number of Steps 5 outside steps.   Bathroom Shower/Tub Tub/Shower unit   Bathroom Toilet Standard   Bathroom Accessibility Accessible   Home Equipment None   Receives Help From Family   ADL Assistance Independent   Homemaking Assistance Independent   Homemaking Responsibilities Yes   Ambulation Assistance Independent   Transfer Assistance Independent   Active  No   Occupation Unemployed   Discharge Planning   Type of Residence House   Living Arrangements Family Members  (Lives with sister.)   Current Services Prior To Admission None   Potential Assistance Needed N/A   DME Ordered? No   Potential Assistance Purchasing Medications No   Type of Home Care Services None   Patient expects to be discharged to: House   One/Two Story Residence One story   History of falls? 1  (Fell 1/1.)   Services At/After Discharge   Transition of Care Consult (CM Consult) Discharge Planning   Services At/After Discharge

## 2024-01-02 NOTE — ED NOTES
Offered pt inpatient hospital bed instead of ED stretcher. PT states he is comfortable on stretcher and would like to wait until later on in the morning to move onto hospital bed. Pt will let staff know if he changes his mind.     Call bell in reach. Family member remains at bedside.

## 2024-01-02 NOTE — ED NOTES
Rounded on pt , pt resting on stretcher on cardiac bp and pulse ox monitoring, alert orientede, only request is ice cream at this time

## 2024-01-02 NOTE — ED NOTES
Repeat after transfusion resulted hgb 7.8  Lul called, orders for q4 h/h placed by writer.     Patient given a few ice chips to moisten mouth but is NPO. Tolerated well and understands he is NPO.    Family at bedside.   Patient on hospital bed awaiting ICU bed placement.

## 2024-01-03 ENCOUNTER — APPOINTMENT (OUTPATIENT)
Facility: HOSPITAL | Age: 69
DRG: 377 | End: 2024-01-03
Payer: MEDICARE

## 2024-01-03 LAB
ALBUMIN SERPL-MCNC: 1.8 G/DL (ref 3.5–5)
ALBUMIN/GLOB SERPL: 1.1 (ref 1.1–2.2)
ALP SERPL-CCNC: 28 U/L (ref 45–117)
ALT SERPL-CCNC: 14 U/L (ref 12–78)
ANION GAP SERPL CALC-SCNC: 2 MMOL/L (ref 5–15)
ANION GAP SERPL CALC-SCNC: 3 MMOL/L (ref 5–15)
AST SERPL W P-5'-P-CCNC: 15 U/L (ref 15–37)
BILIRUB SERPL-MCNC: <0.1 MG/DL (ref 0.2–1)
BNP SERPL-MCNC: 347 PG/ML
BUN SERPL-MCNC: 28 MG/DL (ref 6–20)
BUN SERPL-MCNC: 29 MG/DL (ref 6–20)
BUN/CREAT SERPL: 24 (ref 12–20)
BUN/CREAT SERPL: 30 (ref 12–20)
CA-I BLD-MCNC: 6.2 MG/DL (ref 8.5–10.1)
CA-I BLD-MCNC: 6.9 MG/DL (ref 8.5–10.1)
CHLORIDE SERPL-SCNC: 116 MMOL/L (ref 97–108)
CHLORIDE SERPL-SCNC: 119 MMOL/L (ref 97–108)
CO2 SERPL-SCNC: 21 MMOL/L (ref 21–32)
CO2 SERPL-SCNC: 22 MMOL/L (ref 21–32)
CREAT SERPL-MCNC: 0.94 MG/DL (ref 0.7–1.3)
CREAT SERPL-MCNC: 1.23 MG/DL (ref 0.7–1.3)
ERYTHROCYTE [DISTWIDTH] IN BLOOD BY AUTOMATED COUNT: 16.5 % (ref 11.5–14.5)
GLOBULIN SER CALC-MCNC: 1.7 G/DL (ref 2–4)
GLUCOSE BLD STRIP.AUTO-MCNC: 111 MG/DL (ref 65–100)
GLUCOSE SERPL-MCNC: 147 MG/DL (ref 65–100)
GLUCOSE SERPL-MCNC: 93 MG/DL (ref 65–100)
HCT VFR BLD AUTO: 19.1 % (ref 36.6–50.3)
HCT VFR BLD AUTO: 20.5 % (ref 36.6–50.3)
HCT VFR BLD AUTO: 21.7 % (ref 36.6–50.3)
HCT VFR BLD AUTO: 22 % (ref 36.6–50.3)
HGB BLD-MCNC: 6.3 G/DL (ref 12.1–17)
HGB BLD-MCNC: 6.8 G/DL (ref 12.1–17)
HGB BLD-MCNC: 7.3 G/DL (ref 12.1–17)
HGB BLD-MCNC: 7.5 G/DL (ref 12.1–17)
MCH RBC QN AUTO: 29.1 PG (ref 26–34)
MCHC RBC AUTO-ENTMCNC: 33.2 G/DL (ref 30–36.5)
MCV RBC AUTO: 87.6 FL (ref 80–99)
MRSA DNA SPEC QL NAA+PROBE: NOT DETECTED
NRBC # BLD: 0.05 K/UL (ref 0–0.01)
NRBC BLD-RTO: 0.8 PER 100 WBC
PERFORMED BY:: ABNORMAL
PLATELET # BLD AUTO: 136 K/UL (ref 150–400)
PMV BLD AUTO: 10.6 FL (ref 8.9–12.9)
POTASSIUM SERPL-SCNC: 3.6 MMOL/L (ref 3.5–5.1)
POTASSIUM SERPL-SCNC: 3.9 MMOL/L (ref 3.5–5.1)
PROT SERPL-MCNC: 3.5 G/DL (ref 6.4–8.2)
RBC # BLD AUTO: 2.34 M/UL (ref 4.1–5.7)
SODIUM SERPL-SCNC: 141 MMOL/L (ref 136–145)
SODIUM SERPL-SCNC: 142 MMOL/L (ref 136–145)
TROPONIN I SERPL HS-MCNC: 10 NG/L (ref 0–76)
WBC # BLD AUTO: 6.3 K/UL (ref 4.1–11.1)

## 2024-01-03 PROCEDURE — 85025 COMPLETE CBC W/AUTO DIFF WBC: CPT

## 2024-01-03 PROCEDURE — 84484 ASSAY OF TROPONIN QUANT: CPT

## 2024-01-03 PROCEDURE — 87070 CULTURE OTHR SPECIMN AEROBIC: CPT

## 2024-01-03 PROCEDURE — 85027 COMPLETE CBC AUTOMATED: CPT

## 2024-01-03 PROCEDURE — 80053 COMPREHEN METABOLIC PANEL: CPT

## 2024-01-03 PROCEDURE — 85018 HEMOGLOBIN: CPT

## 2024-01-03 PROCEDURE — 87205 SMEAR GRAM STAIN: CPT

## 2024-01-03 PROCEDURE — 6370000000 HC RX 637 (ALT 250 FOR IP): Performed by: INTERNAL MEDICINE

## 2024-01-03 PROCEDURE — 6370000000 HC RX 637 (ALT 250 FOR IP): Performed by: FAMILY MEDICINE

## 2024-01-03 PROCEDURE — 2580000003 HC RX 258: Performed by: FAMILY MEDICINE

## 2024-01-03 PROCEDURE — 85014 HEMATOCRIT: CPT

## 2024-01-03 PROCEDURE — 83880 ASSAY OF NATRIURETIC PEPTIDE: CPT

## 2024-01-03 PROCEDURE — 2100000000 HC CCU R&B

## 2024-01-03 PROCEDURE — P9016 RBC LEUKOCYTES REDUCED: HCPCS

## 2024-01-03 PROCEDURE — 93005 ELECTROCARDIOGRAM TRACING: CPT | Performed by: FAMILY MEDICINE

## 2024-01-03 PROCEDURE — 30233N1 TRANSFUSION OF NONAUTOLOGOUS RED BLOOD CELLS INTO PERIPHERAL VEIN, PERCUTANEOUS APPROACH: ICD-10-PCS | Performed by: FAMILY MEDICINE

## 2024-01-03 PROCEDURE — 74176 CT ABD & PELVIS W/O CONTRAST: CPT

## 2024-01-03 PROCEDURE — 80048 BASIC METABOLIC PNL TOTAL CA: CPT

## 2024-01-03 PROCEDURE — 6360000002 HC RX W HCPCS: Performed by: FAMILY MEDICINE

## 2024-01-03 PROCEDURE — 82962 GLUCOSE BLOOD TEST: CPT

## 2024-01-03 PROCEDURE — 2000000000 HC ICU R&B

## 2024-01-03 PROCEDURE — 87641 MR-STAPH DNA AMP PROBE: CPT

## 2024-01-03 PROCEDURE — 36430 TRANSFUSION BLD/BLD COMPNT: CPT

## 2024-01-03 RX ORDER — CALCIUM GLUCONATE 20 MG/ML
2000 INJECTION, SOLUTION INTRAVENOUS ONCE
Status: COMPLETED | OUTPATIENT
Start: 2024-01-03 | End: 2024-01-03

## 2024-01-03 RX ORDER — SODIUM CHLORIDE 9 MG/ML
INJECTION, SOLUTION INTRAVENOUS PRN
Status: DISCONTINUED | OUTPATIENT
Start: 2024-01-03 | End: 2024-01-07

## 2024-01-03 RX ORDER — 0.9 % SODIUM CHLORIDE 0.9 %
500 INTRAVENOUS SOLUTION INTRAVENOUS ONCE
Status: DISCONTINUED | OUTPATIENT
Start: 2024-01-03 | End: 2024-01-10 | Stop reason: HOSPADM

## 2024-01-03 RX ORDER — SODIUM CHLORIDE 9 MG/ML
INJECTION, SOLUTION INTRAVENOUS PRN
Status: DISCONTINUED | OUTPATIENT
Start: 2024-01-03 | End: 2024-01-03 | Stop reason: SDUPTHER

## 2024-01-03 RX ORDER — CHOLECALCIFEROL (VITAMIN D3) 125 MCG
5 CAPSULE ORAL NIGHTLY PRN
Status: DISCONTINUED | OUTPATIENT
Start: 2024-01-03 | End: 2024-01-10 | Stop reason: HOSPADM

## 2024-01-03 RX ORDER — 0.9 % SODIUM CHLORIDE 0.9 %
500 INTRAVENOUS SOLUTION INTRAVENOUS ONCE
Status: COMPLETED | OUTPATIENT
Start: 2024-01-03 | End: 2024-01-03

## 2024-01-03 RX ORDER — CALCIUM GLUCONATE 94 MG/ML
2000 INJECTION, SOLUTION INTRAVENOUS ONCE
Status: DISCONTINUED | OUTPATIENT
Start: 2024-01-03 | End: 2024-01-03 | Stop reason: CLARIF

## 2024-01-03 RX ADMIN — SODIUM CHLORIDE 500 ML: 9 INJECTION, SOLUTION INTRAVENOUS at 08:45

## 2024-01-03 RX ADMIN — PANTOPRAZOLE SODIUM 40 MG: 40 TABLET, DELAYED RELEASE ORAL at 18:05

## 2024-01-03 RX ADMIN — CALCIUM GLUCONATE 2000 MG: 20 INJECTION, SOLUTION INTRAVENOUS at 14:00

## 2024-01-03 RX ADMIN — SODIUM CHLORIDE: 9 INJECTION, SOLUTION INTRAVENOUS at 05:21

## 2024-01-03 RX ADMIN — SODIUM CHLORIDE, PRESERVATIVE FREE 10 ML: 5 INJECTION INTRAVENOUS at 14:00

## 2024-01-03 RX ADMIN — SODIUM CHLORIDE, PRESERVATIVE FREE 10 ML: 5 INJECTION INTRAVENOUS at 21:00

## 2024-01-03 RX ADMIN — Medication 5 MG: at 01:40

## 2024-01-03 RX ADMIN — PANTOPRAZOLE SODIUM 40 MG: 40 TABLET, DELAYED RELEASE ORAL at 05:22

## 2024-01-03 ASSESSMENT — PAIN SCALES - GENERAL
PAINLEVEL_OUTOF10: 0
PAINLEVEL_OUTOF10: 8
PAINLEVEL_OUTOF10: 0

## 2024-01-03 ASSESSMENT — PAIN DESCRIPTION - LOCATION: LOCATION: BACK

## 2024-01-03 NOTE — PROGRESS NOTES
Orders received for therapy eval, patient had code RAPID this AM,spoke to RN Gretel and she reported his BP low and hgb 5.7, so he is not appropriate for therapy for today.We will try tomorrow.

## 2024-01-03 NOTE — PLAN OF CARE
Problem: Discharge Planning  Goal: Discharge to home or other facility with appropriate resources  1/2/2024 1726 by Denisha Kerr RN  Outcome: Progressing  1/2/2024 1726 by Denisha Kerr RN  Outcome: Progressing     Problem: Safety - Adult  Goal: Free from fall injury  1/2/2024 2244 by Ynes Chavez RN  Outcome: Progressing  1/2/2024 1726 by Denisha Kerr RN  Outcome: Progressing  1/2/2024 1726 by Denisha Kerr RN  Outcome: Progressing     Problem: Skin/Tissue Integrity - Adult  Goal: Skin integrity remains intact  1/2/2024 2244 by Ynes Chavez RN  Outcome: Progressing  1/2/2024 1726 by Denisha Kerr RN  Outcome: Progressing  1/2/2024 1726 by Denisha Kerr RN  Reactivated  Goal: Incisions, wounds, or drain sites healing without S/S of infection  1/2/2024 1726 by Denisha Kerr RN  Outcome: Progressing  1/2/2024 1726 by Denisha Kerr RN  Reactivated  Goal: Oral mucous membranes remain intact  1/2/2024 1726 by Denisha Kerr RN  Outcome: Progressing  1/2/2024 1726 by Denisha Kerr RN  Reactivated     Problem: Gastrointestinal - Adult  Goal: Minimal or absence of nausea and vomiting  1/2/2024 1726 by Denisha Kerr RN  Outcome: Progressing  1/2/2024 1726 by Denisha Kerr RN  Reactivated  Goal: Maintains or returns to baseline bowel function  1/2/2024 1726 by Denisha Kerr RN  Outcome: Progressing  1/2/2024 1726 by Denisha Kerr RN  Reactivated  Goal: Maintains adequate nutritional intake  1/2/2024 1726 by Denisha Kerr RN  Outcome: Progressing  1/2/2024 1726 by Denisha Kerr RN  Reactivated     Problem: Hematologic - Adult  Goal: Maintains hematologic stability  1/2/2024 2244 by Ynes Chavez RN  Outcome: Progressing  1/2/2024 1726 by Denisha Kerr RN  Outcome: Progressing  1/2/2024 1726 by Denisha Kerr RN  Reactivated
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
## 2024-01-03 NOTE — PROGRESS NOTES
General Daily Progress Note      Patient Name:   George West       YOB: 1955       Age:  68 y.o.      Admit Date: 1/1/2024      Subjective:        Patient is a 68 y.o. year old male history of alcohol dependency came to emergency room complaining of hematemesis and rectal bleed  According to the patient, patient drinking one beer every single day  Black stool for the last 3 months and bleeding rectally since last night and having hematemesis     When I saw the patient resting the bed hemoglobin came back 2.5     I ordered 4 unit of stat blood transfusion     GI was consulted by the ER physician for emergent scope     Patient's sister at the bedside  For the blood work shows acute kidney injury with metabolic acidosis     Patient was started on a octreotide and Protonix     Patient admitted to ICU for further workup and     Patient on meloxicam and Motrin at home    1/2    Patient have endoscopy done shows gastritis and oozing duodenal ulcer    1/3    Patient's hemoglobin dropped to 5.7 this morning, and he became hypotensive. He was transfused with two units, and his blood pressure improved. Reports some tremors, but nothing significant. Abdominal pain in all 4 quadrants.             Objective:     Vitals:    01/03/24 1115   BP: 100/61   Pulse: 71   Resp: 16   Temp: 98 °F (36.7 °C)   SpO2: 100%        Recent Results (from the past 24 hour(s))   POC Lactic Acid    Collection Time: 01/02/24 11:57 AM   Result Value Ref Range    POC Lactic Acid 0.56 0.40 - 2.00 mmol/L    Performed by: AKANKSHA LENZ    Comprehensive Metabolic Panel w/ Reflex to MG    Collection Time: 01/02/24  5:31 PM   Result Value Ref Range    Sodium 143 136 - 145 mmol/L    Potassium 4.4 3.5 - 5.1 mmol/L    Chloride 114 (H) 97 - 108 mmol/L    CO2 26 21 - 32 mmol/L    Anion Gap 3 (L) 5 - 15 mmol/L    Glucose 115 (H) 65 - 100 mg/dL    BUN 44 (H) 6 - 20 mg/dL    Creatinine 1.72 (H) 0.70 - 1.30 mg/dL    Bun/Cre Ratio 26 (H) 12 -  2042    sodium chloride flush 0.9 % injection 5-40 mL, 5-40 mL, IntraVENous, PRN, Brando Mccarty MD    0.9 % sodium chloride infusion, , IntraVENous, PRN, Brando Mccarty MD    ondansetron (ZOFRAN-ODT) disintegrating tablet 4 mg, 4 mg, Oral, Q8H PRN **OR** ondansetron (ZOFRAN) injection 4 mg, 4 mg, IntraVENous, Q6H PRN, Brando Mccarty MD, 4 mg at 01/01/24 1305    polyethylene glycol (GLYCOLAX) packet 17 g, 17 g, Oral, Daily PRN, Brando Mccarty MD    acetaminophen (TYLENOL) tablet 650 mg, 650 mg, Oral, Q6H PRN **OR** acetaminophen (TYLENOL) suppository 650 mg, 650 mg, Rectal, Q6H PRN, Brando Mccarty MD

## 2024-01-03 NOTE — PROGRESS NOTES
4 Eyes Skin Assessment     NAME:  George West  YOB: 1955  MEDICAL RECORD NUMBER:  [id number]    The patient is being assessed for  Other weekly Skin Assessment    I agree that at least one RN has performed a thorough Head to Toe Skin Assessment on the patient. ALL assessment sites listed below have been assessed.      Areas assessed by both nurses:    Head, Face, Ears, Shoulders, Back, Chest, Arms, Elbows, Hands, Sacrum. Buttock, Coccyx, Ischium, Legs. Feet and Heels, and Under Medical Devices         Does the Patient have a Wound? No noted wound(s)       Vasyl Prevention initiated by RN: Yes  Wound Care Orders initiated by RN: No    Pressure Injury (Stage 3,4, Unstageable, DTI, NWPT, and Complex wounds) if present, place Wound referral order by RN under : No    New Ostomies, if present place, Ostomy referral order under : No     Nurse 1 eSignature: Electronically signed by Ynes Chavez RN on 1/3/24 at 3:20 AM EST    **SHARE this note so that the co-signing nurse can place an eSignature**    Nurse 2 eSignature: Electronically signed by Laura Hernandez RN on 1/3/24 at 3:24 AM EST

## 2024-01-03 NOTE — PROGRESS NOTES
Patient currently on PT/OT caseload, however patient transferred to ICU from 574 due to medical decline. Pt currently placed on hold and current PT/OT orders will be discontinued at this time. Will need new PT/OT evaluation order once pt medically stable . Thank you.

## 2024-01-03 NOTE — PROGRESS NOTES
Rapid response called on this patient. Patient blood pressure was soft and complaints of chest pain. Vital signs were taken and documented in flowsheet. Patient was placed in trendelenburg and blood pressure came up but still soft. MD  arrived to the room gave orders for labs, EKG, O2, and bolus. Will continue to monitor patient. Family at bedside and event was explained to them.

## 2024-01-03 NOTE — PROGRESS NOTES
General Daily Progress Note      Patient Name:   George West       YOB: 1955       Age:  68 y.o.      Admit Date: 1/1/2024      Subjective:        Patient is a 68 y.o. year old male history of alcohol dependency came to emergency room complaining of hematemesis and rectal bleed  According to the patient, patient drinking one beer every single day  Black stool for the last 3 months and bleeding rectally since last night and having hematemesis     When I saw the patient resting the bed hemoglobin came back 2.5     I ordered 4 unit of stat blood transfusion     GI was consulted by the ER physician for emergent scope     Patient's sister at the bedside  For the blood work shows acute kidney injury with metabolic acidosis     Patient was started on a octreotide and Protonix     Patient admitted to ICU for further workup and     Patient on meloxicam and Motrin at home    1/2    Patient have endoscopy done shows gastritis and oozing duodenal ulcer    1/3    Patient's hemoglobin dropped to 5.7 this morning, and he became hypotensive. He was transfused with two units, and his blood pressure improved. Reports some tremors, but nothing significant. Abdominal pain in all 4 quadrants.    Patient have rapid response this morning blood pressure drops ordered 1 L IV fluids hemoglobin dropped to 5.7             Objective:     Vitals:    01/03/24 1230   BP: 97/62   Pulse: 69   Resp: 16   Temp: 98.6 °F (37 °C)   SpO2: 100%        Recent Results (from the past 24 hour(s))   Comprehensive Metabolic Panel w/ Reflex to MG    Collection Time: 01/02/24  5:31 PM   Result Value Ref Range    Sodium 143 136 - 145 mmol/L    Potassium 4.4 3.5 - 5.1 mmol/L    Chloride 114 (H) 97 - 108 mmol/L    CO2 26 21 - 32 mmol/L    Anion Gap 3 (L) 5 - 15 mmol/L    Glucose 115 (H) 65 - 100 mg/dL    BUN 44 (H) 6 - 20 mg/dL    Creatinine 1.72 (H) 0.70 - 1.30 mg/dL    Bun/Cre Ratio 26 (H) 12 - 20      Est, Glom Filt Rate 43 (L) >60  Value Ref Range    NT Pro- (H) <125 pg/mL     [unfilled]        Review of Systems    Constitutional: Negative for chills and fever.   HENT: Negative.    Eyes: Negative.    Respiratory: Negative.    Cardiovascular: Negative.    Gastrointestinal: Negative for abdominal pain and nausea.   Skin: Negative.    Neurological: Negative.        Physical Exam:      Constitutional: pt is oriented to person, place, and time.   HENT:   Head: Normocephalic and atraumatic.   Eyes: Pupils are equal, round, and reactive to light. EOM are normal.   Cardiovascular: Normal rate, regular rhythm and normal heart sounds.   Pulmonary/Chest: Breath sounds normal. No wheezes. No rales.   Exhibits no tenderness.   Abdominal: Distended. Bowel sounds are normal. Abdominal tenderness in all four quadrants. There is no rebound and no guarding.   Musculoskeletal: Normal range of motion.   Neurological: pt is alert and oriented to person, place, and time.     CT CHEST ABDOMEN PELVIS W CONTRAST Additional Contrast? None   Final Result   Addendum (preliminary) 1 of 1   Addendum: Addendum: Upon review, the stomach is distended with mixed    densities   including air, low density fluid, soft tissue density at 28 2, and    hyperdensity   in the region of the GE junction (image 60). This is concerning for a    bleeding   gastroesophageal varix (coronal image 53) in this clinical setting.      Final   No acute pulmonary process seen.   Innumerable subcentimeter renal hypodensities, not further characterized.   Cystitis      CT HEAD WO CONTRAST   Final Result   No acute findings.      CT ABDOMEN WO CONTRAST Additional Contrast? None    (Results Pending)        Recent Results (from the past 24 hour(s))   Comprehensive Metabolic Panel w/ Reflex to MG    Collection Time: 01/02/24  5:31 PM   Result Value Ref Range    Sodium 143 136 - 145 mmol/L    Potassium 4.4 3.5 - 5.1 mmol/L    Chloride 114 (H) 97 - 108 mmol/L    CO2 26 21 - 32 mmol/L    Anion Gap

## 2024-01-04 LAB
ABO + RH BLD: NORMAL
ALBUMIN SERPL-MCNC: 1.9 G/DL (ref 3.5–5)
ALBUMIN/GLOB SERPL: 1 (ref 1.1–2.2)
ALP SERPL-CCNC: 30 U/L (ref 45–117)
ALT SERPL-CCNC: 26 U/L (ref 12–78)
ANION GAP SERPL CALC-SCNC: 3 MMOL/L (ref 5–15)
AST SERPL W P-5'-P-CCNC: 27 U/L (ref 15–37)
BILIRUB SERPL-MCNC: <0.1 MG/DL (ref 0.2–1)
BLD PROD TYP BPU: NORMAL
BLOOD BANK DISPENSE STATUS: NORMAL
BLOOD GROUP ANTIBODIES SERPL: NEGATIVE
BPU ID: NORMAL
BUN SERPL-MCNC: 29 MG/DL (ref 6–20)
BUN/CREAT SERPL: 25 (ref 12–20)
CA-I BLD-MCNC: 7.2 MG/DL (ref 8.5–10.1)
CHLORIDE SERPL-SCNC: 112 MMOL/L (ref 97–108)
CO2 SERPL-SCNC: 23 MMOL/L (ref 21–32)
CREAT SERPL-MCNC: 1.14 MG/DL (ref 0.7–1.3)
CROSSMATCH RESULT: NORMAL
EKG ATRIAL RATE: 65 BPM
EKG DIAGNOSIS: NORMAL
EKG Q-T INTERVAL: 402 MS
EKG QRS DURATION: 80 MS
EKG QTC CALCULATION (BAZETT): 424 MS
EKG R AXIS: 42 DEGREES
EKG T AXIS: 33 DEGREES
EKG VENTRICULAR RATE: 67 BPM
ERYTHROCYTE [DISTWIDTH] IN BLOOD BY AUTOMATED COUNT: 15.3 % (ref 11.5–14.5)
GLOBULIN SER CALC-MCNC: 1.9 G/DL (ref 2–4)
GLUCOSE SERPL-MCNC: 130 MG/DL (ref 65–100)
HCT VFR BLD AUTO: 21.3 % (ref 36.6–50.3)
HCT VFR BLD AUTO: 21.4 % (ref 36.6–50.3)
HCT VFR BLD AUTO: 21.4 % (ref 36.6–50.3)
HCT VFR BLD AUTO: 22.7 % (ref 36.6–50.3)
HGB BLD-MCNC: 7.1 G/DL (ref 12.1–17)
HGB BLD-MCNC: 7.3 G/DL (ref 12.1–17)
HGB BLD-MCNC: 7.3 G/DL (ref 12.1–17)
HGB BLD-MCNC: 7.8 G/DL (ref 12.1–17)
MCH RBC QN AUTO: 30.7 PG (ref 26–34)
MCHC RBC AUTO-ENTMCNC: 34.1 G/DL (ref 30–36.5)
MCV RBC AUTO: 89.9 FL (ref 80–99)
NRBC # BLD: 0.07 K/UL (ref 0–0.01)
NRBC BLD-RTO: 0.9 PER 100 WBC
PLATELET # BLD AUTO: 117 K/UL (ref 150–400)
PMV BLD AUTO: 11.2 FL (ref 8.9–12.9)
POTASSIUM SERPL-SCNC: 4.1 MMOL/L (ref 3.5–5.1)
PROT SERPL-MCNC: 3.8 G/DL (ref 6.4–8.2)
RBC # BLD AUTO: 2.38 M/UL (ref 4.1–5.7)
SODIUM SERPL-SCNC: 138 MMOL/L (ref 136–145)
SPECIMEN EXP DATE BLD: NORMAL
TRANSFUSION STATUS PATIENT QL: NORMAL
UNIT DIVISION: 0
WBC # BLD AUTO: 7.5 K/UL (ref 4.1–11.1)

## 2024-01-04 PROCEDURE — 6370000000 HC RX 637 (ALT 250 FOR IP): Performed by: INTERNAL MEDICINE

## 2024-01-04 PROCEDURE — 85018 HEMOGLOBIN: CPT

## 2024-01-04 PROCEDURE — 2060000000 HC ICU INTERMEDIATE R&B

## 2024-01-04 PROCEDURE — 80053 COMPREHEN METABOLIC PANEL: CPT

## 2024-01-04 PROCEDURE — 85014 HEMATOCRIT: CPT

## 2024-01-04 PROCEDURE — 36415 COLL VENOUS BLD VENIPUNCTURE: CPT

## 2024-01-04 PROCEDURE — 85027 COMPLETE CBC AUTOMATED: CPT

## 2024-01-04 PROCEDURE — 2580000003 HC RX 258: Performed by: FAMILY MEDICINE

## 2024-01-04 RX ADMIN — PANTOPRAZOLE SODIUM 40 MG: 40 TABLET, DELAYED RELEASE ORAL at 15:58

## 2024-01-04 RX ADMIN — PANTOPRAZOLE SODIUM 40 MG: 40 TABLET, DELAYED RELEASE ORAL at 07:44

## 2024-01-04 RX ADMIN — SODIUM CHLORIDE, PRESERVATIVE FREE 20 ML: 5 INJECTION INTRAVENOUS at 07:44

## 2024-01-04 RX ADMIN — SODIUM CHLORIDE, PRESERVATIVE FREE 10 ML: 5 INJECTION INTRAVENOUS at 19:54

## 2024-01-04 ASSESSMENT — PAIN SCALES - GENERAL
PAINLEVEL_OUTOF10: 2
PAINLEVEL_OUTOF10: 0
PAINLEVEL_OUTOF10: 0

## 2024-01-04 ASSESSMENT — PAIN DESCRIPTION - DESCRIPTORS: DESCRIPTORS: DULL

## 2024-01-04 ASSESSMENT — PAIN DESCRIPTION - LOCATION: LOCATION: ABDOMEN

## 2024-01-04 ASSESSMENT — PAIN - FUNCTIONAL ASSESSMENT: PAIN_FUNCTIONAL_ASSESSMENT: ACTIVITIES ARE NOT PREVENTED

## 2024-01-04 ASSESSMENT — PAIN DESCRIPTION - ORIENTATION: ORIENTATION: MID
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
## 2024-01-04 NOTE — PROGRESS NOTES
General Daily Progress Note      Patient Name:   George West       YOB: 1955       Age:  68 y.o.      Admit Date: 1/1/2024      Subjective:        Patient is a 68 y.o. year old male history of alcohol dependency came to emergency room complaining of hematemesis and rectal bleed  According to the patient, patient drinking one beer every single day  Black stool for the last 3 months and bleeding rectally since last night and having hematemesis     When I saw the patient resting the bed hemoglobin came back 2.5     I ordered 4 unit of stat blood transfusion     GI was consulted by the ER physician for emergent scope     Patient's sister at the bedside  For the blood work shows acute kidney injury with metabolic acidosis     Patient was started on a octreotide and Protonix     Patient admitted to ICU for further workup and     Patient on meloxicam and Motrin at home    1/2    Patient have endoscopy done shows gastritis and oozing duodenal ulcer    1/3    Patient's hemoglobin dropped to 5.7 this morning, and he became hypotensive. He was transfused with two units, and his blood pressure improved. Reports some tremors, but nothing significant. Abdominal pain in all 4 quadrants.    Patient have rapid response this morning blood pressure drops ordered 1 L IV fluids hemoglobin dropped to 5.7    1/4    Patient blood pressure drops as hemoglobin drops transferred to ICU received IV fluid boluses and blood transfusion this morning patient more alert awake family at the bedside hemoglobin is 7.3  CT scan of the abdomen which showed no sign of bleeding             Objective:     Vitals:    01/04/24 1000   BP: (!) 97/53   Pulse: 67   Resp: 20   Temp:    SpO2: 100%        Recent Results (from the past 24 hour(s))   MRSA by PCR    Collection Time: 01/03/24  3:00 PM    Specimen: Swab   Result Value Ref Range    MRSA by PCR Not Detected Not Detected     Hemoglobin and Hematocrit    Collection Time: 01/03/24    Assessment:     Acute GI bleed  Symptomatic anemia  Alcohol dependency  Acute kidney injury  Metabolic acidosis  Hypotension  Gastritis with duodenal ulcer    Plan:     Monitor H&H, transfuse if hemoglobin falls below 7  Continue with SIWA protocol  Continue protonix 40 mg BID PO  Full liquid diet  Treat H. Pylori if biopsy is positive    Transfer to telemetry floor        Current Facility-Administered Medications:     melatonin tablet 5 mg, 5 mg, Oral, Nightly PRN, Brando Mccarty MD, 5 mg at 01/03/24 0140    sodium chloride 0.9 % bolus 500 mL, 500 mL, IntraVENous, Once, Brando Mccarty MD, Held at 01/03/24 1100    0.9 % sodium chloride infusion, , IntraVENous, PRN, Brando Mccarty MD    pantoprazole (PROTONIX) tablet 40 mg, 40 mg, Oral, BID AC, Linh Olivier MD, 40 mg at 01/04/24 0744    0.9 % sodium chloride infusion, , IntraVENous, Continuous, Brando Mccarty MD, Last Rate: 125 mL/hr at 01/03/24 0521, New Bag at 01/03/24 0521    sodium chloride flush 0.9 % injection 5-40 mL, 5-40 mL, IntraVENous, 2 times per day, Brando Mccarty MD, 20 mL at 01/04/24 0744    sodium chloride flush 0.9 % injection 5-40 mL, 5-40 mL, IntraVENous, PRN, Brando Mccarty MD    0.9 % sodium chloride infusion, , IntraVENous, PRN, Brando Mccarty MD    ondansetron (ZOFRAN-ODT) disintegrating tablet 4 mg, 4 mg, Oral, Q8H PRN **OR** ondansetron (ZOFRAN) injection 4 mg, 4 mg, IntraVENous, Q6H PRN, Brando Mccarty MD, 4 mg at 01/01/24 1305    polyethylene glycol (GLYCOLAX) packet 17 g, 17 g, Oral, Daily PRN, Brando Mccarty MD    acetaminophen (TYLENOL) tablet 650 mg, 650 mg, Oral, Q6H PRN **OR** acetaminophen (TYLENOL) suppository 650 mg, 650 mg, Rectal, Q6H PRN, Brando Mccarty MD

## 2024-01-04 NOTE — PROGRESS NOTES
Physician Progress Note      PATIENT:               CONCETTA WHITMORE  [facility] #:                  [id number]  :                       1955  ADMIT DATE:       2024 5:53 AM  DISCH DATE:  RESPONDING  PROVIDER #:        Linh Olivier MD        QUERY TEXT:    Type of Anemia: Please provide further specificity, if known.    Clinical indicators include: hematemesis, black stool, bleeding, hemoglobin, 4   units, blood transfusion, transfusion, transfuse, rbc, hematocrit, platelets,   bleed, gi bleed, anemia, transfused 4 units packed rbc, 4 unit, transfused,   two units, hemoglobin and hematocrit, hgb, hct, iron, tibc, ferritin, h&h, h,   platelet, red blood cells  Options provided:  -- Anemia due to acute blood loss  -- Anemia due to chronic blood loss  -- Anemia due to iron deficiency  -- Anemia due to postoperative blood loss  -- Anemia due to chronic disease  -- Other - I will add my own diagnosis  -- Disagree - Not applicable / Not valid  -- Disagree - Clinically Unable to determine / Unknown        PROVIDER RESPONSE TEXT:    The patient has anemia due to acute blood loss.      Electronically signed by:  Linh Olivier MD 2024 9:28 AM

## 2024-01-04 NOTE — PROGRESS NOTES
edside and Verbal shift change report given to Xin JONES (oncoming nurse) by Av Loyd RN (offgoing nurse). Report included the following information SBAR, Kardex, MAR and Recent Results.    SITUATION:   Code Status: Full Code  Reason for Admission: Hematemesis [K92.0]  Upper GI bleed [K92.2]  Fall, initial encounter [W19.XXXA]    Hospital day: 3  Problem List:         BACKGROUND:    Past Medical History:   Past Medical History:   Diagnosis Date    Asthma          Patient taking anticoagulants No     ASSESSMENT:   Changes in Assessment Throughout Shift: No    Patient has Central Line: No Reasons if yes: PIV x 2  Patient has Curtis Cath: No Reasons if yes: Jesica     Last Vitals:   [unfilled]    IV and DRAINS (will only show if present)        WOUND (if present)   Wound Type:  See LDA   Dressing present     Wound Concerns/Notes:  No    PAIN    Pain Assessment                      Interventions for Pain:  none  Intervention effective: N/A  Time of last intervention: N/A   Reassessment Completed: N/A     Last 3 Weights:  [unfilled]  Weight change:     INTAKE/OUPUT    Current Shift: No intake/output data recorded.    Last three shifts: 01/02 1901 - 01/04 0700  In: 2999.9 [P.O.:250; I.V.:1500]  Out: 2150 [Urine:2150]    LAB RESULTS     Recent Labs     01/03/24  0545 01/03/24  0816 01/03/24  0854 01/03/24  2320 01/04/24  0315   WBC 6.3  --  6.8  --  7.5   HGB 6.8*   < > 5.7* 7.3* 7.3*  7.3*   HCT 20.5*   < > 17.0* 21.7* 21.4*  21.4*   *  --  144*  --  117*    < > = values in this interval not displayed.        Recent Labs     01/01/24  0757 01/02/24  1731 01/03/24  0545 01/03/24  0854 01/04/24  0320      < > 142 141 138   K 4.2   < > 3.6 3.9 4.1   BUN 61*   < > 28* 29* 29*   INR 1.2*  --   --   --   --     < > = values in this interval not displayed.       RECOMMENDATIONS AND DISCHARGE PLANNING     Pending tests/procedures/ Plan of Care or Other Needs: Downgrade// See provider notes     Discharge  plan for patient and Needs/Barriers: None    Estimated Discharge Date: TBD Posted on Whiteboard in Patient’s Room: No      4. The patient's care plan was reviewed with the oncoming nurse.       \"HEALS\" SAFETY CHECK      Fall Risk         Safety Measures:      A safety check occurred in the patient's room between off going nurse and oncoming nurse listed above.    The safety check included the below items  Area Items   H  High Alert Medications Verify all high alert medication drips (heparin, PCA, etc.)   E  Equipment Suction is set up for ALL patients (with elizabeth)  Red plugs utilized for all equipment (IV pumps, etc.)  WOW’s wiped down at end of shift.  Room stocked with oxygen, suction, and other unit-specific supplies   A  Alarms Bed alarm is set for fall risk patients  Ensure chair alarm is in place and activated if patient is up in a chair   L  Lines Check IV for any infiltration  Curtis bag is empty if patient has a Curtis   Tubing and IV bags are labeled   S  Safety   Room is clean, patient is clean, and equipment is clean.  Hallways are clear from equipment besides carts.   Fall bracelet on for fall risk patients  Ensure room is clear and free of clutter  Suction is set up for ALL patients (with elizabeth)  Hallways are clear from equipment besides carts.   Isolation precautions followed, supplies available outside room, sign posted     Av Loyd RN

## 2024-01-05 ENCOUNTER — ANESTHESIA (OUTPATIENT)
Facility: HOSPITAL | Age: 69
DRG: 377 | End: 2024-01-05
Payer: MEDICARE

## 2024-01-05 ENCOUNTER — ANESTHESIA EVENT (OUTPATIENT)
Facility: HOSPITAL | Age: 69
DRG: 377 | End: 2024-01-05
Payer: MEDICARE

## 2024-01-05 LAB
ALBUMIN SERPL-MCNC: 1.8 G/DL (ref 3.5–5)
ALBUMIN/GLOB SERPL: 0.9 (ref 1.1–2.2)
ALP SERPL-CCNC: 38 U/L (ref 45–117)
ALT SERPL-CCNC: 26 U/L (ref 12–78)
ANION GAP SERPL CALC-SCNC: 7 MMOL/L (ref 5–15)
AST SERPL W P-5'-P-CCNC: 23 U/L (ref 15–37)
BACTERIA SPEC CULT: NORMAL
BACTERIA SPEC CULT: NORMAL
BASOPHILS # BLD: 0 K/UL (ref 0–0.1)
BASOPHILS NFR BLD: 0 % (ref 0–1)
BILIRUB SERPL-MCNC: 0.1 MG/DL (ref 0.2–1)
BUN SERPL-MCNC: 33 MG/DL (ref 6–20)
BUN/CREAT SERPL: 28 (ref 12–20)
CA-I BLD-MCNC: 7.4 MG/DL (ref 8.5–10.1)
CHLORIDE SERPL-SCNC: 111 MMOL/L (ref 97–108)
CO2 SERPL-SCNC: 22 MMOL/L (ref 21–32)
CREAT SERPL-MCNC: 1.17 MG/DL (ref 0.7–1.3)
DIFFERENTIAL METHOD BLD: ABNORMAL
EOSINOPHIL # BLD: 0.1 K/UL (ref 0–0.4)
EOSINOPHIL NFR BLD: 1 % (ref 0–7)
ERYTHROCYTE [DISTWIDTH] IN BLOOD BY AUTOMATED COUNT: 15.8 % (ref 11.5–14.5)
ERYTHROCYTE [DISTWIDTH] IN BLOOD BY AUTOMATED COUNT: 16.8 % (ref 11.5–14.5)
GLOBULIN SER CALC-MCNC: 2 G/DL (ref 2–4)
GLUCOSE SERPL-MCNC: 139 MG/DL (ref 65–100)
GRAM STN SPEC: NORMAL
HCT VFR BLD AUTO: 17 % (ref 36.6–50.3)
HCT VFR BLD AUTO: 18.8 % (ref 36.6–50.3)
HCT VFR BLD AUTO: 20.4 % (ref 36.6–50.3)
HCT VFR BLD AUTO: 20.8 % (ref 36.6–50.3)
HGB BLD-MCNC: 5.7 G/DL (ref 12.1–17)
HGB BLD-MCNC: 6.4 G/DL (ref 12.1–17)
HGB BLD-MCNC: 7 G/DL (ref 12.1–17)
HGB BLD-MCNC: 7.1 G/DL (ref 12.1–17)
IMM GRANULOCYTES # BLD AUTO: 0 K/UL (ref 0–0.04)
IMM GRANULOCYTES NFR BLD AUTO: 0 % (ref 0–0.5)
LYMPHOCYTES # BLD: 2.5 K/UL (ref 0.8–3.5)
LYMPHOCYTES NFR BLD: 37 % (ref 12–49)
Lab: NORMAL
Lab: NORMAL
MCH RBC QN AUTO: 30.2 PG (ref 26–34)
MCH RBC QN AUTO: 30.8 PG (ref 26–34)
MCHC RBC AUTO-ENTMCNC: 33.5 G/DL (ref 30–36.5)
MCHC RBC AUTO-ENTMCNC: 34 G/DL (ref 30–36.5)
MCV RBC AUTO: 89.9 FL (ref 80–99)
MCV RBC AUTO: 90.4 FL (ref 80–99)
MONOCYTES # BLD: 0.5 K/UL (ref 0–1)
MONOCYTES NFR BLD: 8 % (ref 5–13)
NEUTS SEG # BLD: 3.7 K/UL (ref 1.8–8)
NEUTS SEG NFR BLD: 54 % (ref 32–75)
NRBC # BLD: 0.03 K/UL (ref 0–0.01)
NRBC # BLD: 0.05 K/UL (ref 0–0.01)
NRBC BLD-RTO: 0.4 PER 100 WBC
NRBC BLD-RTO: 0.7 PER 100 WBC
PATH REV BLD -IMP: ABNORMAL
PLATELET # BLD AUTO: 144 K/UL (ref 150–400)
PLATELET # BLD AUTO: 145 K/UL (ref 150–400)
PMV BLD AUTO: 11 FL (ref 8.9–12.9)
PMV BLD AUTO: 11.1 FL (ref 8.9–12.9)
POTASSIUM SERPL-SCNC: 4 MMOL/L (ref 3.5–5.1)
PROT SERPL-MCNC: 3.8 G/DL (ref 6.4–8.2)
RBC # BLD AUTO: 1.89 M/UL (ref 4.1–5.7)
RBC # BLD AUTO: 2.08 M/UL (ref 4.1–5.7)
RBC MORPH BLD: ABNORMAL
RBC MORPH BLD: ABNORMAL
SODIUM SERPL-SCNC: 140 MMOL/L (ref 136–145)
WBC # BLD AUTO: 6.8 K/UL (ref 4.1–11.1)
WBC # BLD AUTO: 7.9 K/UL (ref 4.1–11.1)
WBC MORPH BLD: ABNORMAL

## 2024-01-05 PROCEDURE — 3700000000 HC ANESTHESIA ATTENDED CARE: Performed by: INTERNAL MEDICINE

## 2024-01-05 PROCEDURE — 3600007512: Performed by: INTERNAL MEDICINE

## 2024-01-05 PROCEDURE — 6360000002 HC RX W HCPCS: Performed by: INTERNAL MEDICINE

## 2024-01-05 PROCEDURE — 6370000000 HC RX 637 (ALT 250 FOR IP): Performed by: INTERNAL MEDICINE

## 2024-01-05 PROCEDURE — 3E0G8GC INTRODUCTION OF OTHER THERAPEUTIC SUBSTANCE INTO UPPER GI, VIA NATURAL OR ARTIFICIAL OPENING ENDOSCOPIC: ICD-10-PCS | Performed by: INTERNAL MEDICINE

## 2024-01-05 PROCEDURE — 86901 BLOOD TYPING SEROLOGIC RH(D): CPT

## 2024-01-05 PROCEDURE — P9016 RBC LEUKOCYTES REDUCED: HCPCS

## 2024-01-05 PROCEDURE — 36415 COLL VENOUS BLD VENIPUNCTURE: CPT

## 2024-01-05 PROCEDURE — C1052 HEMOSTATIC AGENT, GI, TOPIC: HCPCS | Performed by: INTERNAL MEDICINE

## 2024-01-05 PROCEDURE — 2580000003 HC RX 258: Performed by: FAMILY MEDICINE

## 2024-01-05 PROCEDURE — XW0G886 INTRODUCTION OF MINERAL-BASED TOPICAL HEMOSTATIC AGENT INTO UPPER GI, VIA NATURAL OR ARTIFICIAL OPENING ENDOSCOPIC, NEW TECHNOLOGY GROUP 6: ICD-10-PCS | Performed by: INTERNAL MEDICINE

## 2024-01-05 PROCEDURE — 86850 RBC ANTIBODY SCREEN: CPT

## 2024-01-05 PROCEDURE — 86923 COMPATIBILITY TEST ELECTRIC: CPT

## 2024-01-05 PROCEDURE — 3700000001 HC ADD 15 MINUTES (ANESTHESIA): Performed by: INTERNAL MEDICINE

## 2024-01-05 PROCEDURE — 85027 COMPLETE CBC AUTOMATED: CPT

## 2024-01-05 PROCEDURE — 80053 COMPREHEN METABOLIC PANEL: CPT

## 2024-01-05 PROCEDURE — 6360000002 HC RX W HCPCS: Performed by: FAMILY MEDICINE

## 2024-01-05 PROCEDURE — 85018 HEMOGLOBIN: CPT

## 2024-01-05 PROCEDURE — 85014 HEMATOCRIT: CPT

## 2024-01-05 PROCEDURE — 7100000011 HC PHASE II RECOVERY - ADDTL 15 MIN: Performed by: INTERNAL MEDICINE

## 2024-01-05 PROCEDURE — 3600007502: Performed by: INTERNAL MEDICINE

## 2024-01-05 PROCEDURE — C9113 INJ PANTOPRAZOLE SODIUM, VIA: HCPCS | Performed by: INTERNAL MEDICINE

## 2024-01-05 PROCEDURE — 7100000010 HC PHASE II RECOVERY - FIRST 15 MIN: Performed by: INTERNAL MEDICINE

## 2024-01-05 PROCEDURE — 2060000000 HC ICU INTERMEDIATE R&B

## 2024-01-05 PROCEDURE — 36430 TRANSFUSION BLD/BLD COMPNT: CPT

## 2024-01-05 PROCEDURE — 86900 BLOOD TYPING SEROLOGIC ABO: CPT

## 2024-01-05 PROCEDURE — 2580000003 HC RX 258: Performed by: NURSE ANESTHETIST, CERTIFIED REGISTERED

## 2024-01-05 PROCEDURE — 6370000000 HC RX 637 (ALT 250 FOR IP): Performed by: FAMILY MEDICINE

## 2024-01-05 RX ORDER — PANTOPRAZOLE SODIUM 40 MG/10ML
40 INJECTION, POWDER, LYOPHILIZED, FOR SOLUTION INTRAVENOUS 2 TIMES DAILY
Status: DISCONTINUED | OUTPATIENT
Start: 2024-01-05 | End: 2024-01-05

## 2024-01-05 RX ORDER — SODIUM CHLORIDE, SODIUM LACTATE, POTASSIUM CHLORIDE, CALCIUM CHLORIDE 600; 310; 30; 20 MG/100ML; MG/100ML; MG/100ML; MG/100ML
INJECTION, SOLUTION INTRAVENOUS CONTINUOUS PRN
Status: DISCONTINUED | OUTPATIENT
Start: 2024-01-05 | End: 2024-01-05 | Stop reason: SDUPTHER

## 2024-01-05 RX ORDER — LIDOCAINE HYDROCHLORIDE 20 MG/ML
INJECTION, SOLUTION EPIDURAL; INFILTRATION; INTRACAUDAL; PERINEURAL PRN
Status: DISCONTINUED | OUTPATIENT
Start: 2024-01-05 | End: 2024-01-05 | Stop reason: SDUPTHER

## 2024-01-05 RX ORDER — SODIUM CHLORIDE 9 MG/ML
INJECTION, SOLUTION INTRAVENOUS PRN
Status: DISCONTINUED | OUTPATIENT
Start: 2024-01-05 | End: 2024-01-07

## 2024-01-05 RX ORDER — GLYCOPYRROLATE 0.2 MG/ML
INJECTION INTRAMUSCULAR; INTRAVENOUS PRN
Status: DISCONTINUED | OUTPATIENT
Start: 2024-01-05 | End: 2024-01-05 | Stop reason: SDUPTHER

## 2024-01-05 RX ORDER — PANTOPRAZOLE SODIUM 40 MG/10ML
40 INJECTION, POWDER, LYOPHILIZED, FOR SOLUTION INTRAVENOUS 2 TIMES DAILY
Status: DISCONTINUED | OUTPATIENT
Start: 2024-01-05 | End: 2024-01-08

## 2024-01-05 RX ADMIN — LIDOCAINE HYDROCHLORIDE 50 MG: 20 INJECTION, SOLUTION EPIDURAL; INFILTRATION; INTRACAUDAL; PERINEURAL at 13:25

## 2024-01-05 RX ADMIN — ACETAMINOPHEN 650 MG: 325 TABLET ORAL at 07:53

## 2024-01-05 RX ADMIN — PANTOPRAZOLE SODIUM 40 MG: 40 INJECTION, POWDER, FOR SOLUTION INTRAVENOUS at 16:30

## 2024-01-05 RX ADMIN — PANTOPRAZOLE SODIUM 40 MG: 40 TABLET, DELAYED RELEASE ORAL at 07:51

## 2024-01-05 RX ADMIN — PANTOPRAZOLE SODIUM 40 MG: 40 INJECTION, POWDER, FOR SOLUTION INTRAVENOUS at 22:26

## 2024-01-05 RX ADMIN — Medication 5 MG: at 22:26

## 2024-01-05 RX ADMIN — SODIUM CHLORIDE, PRESERVATIVE FREE 10 ML: 5 INJECTION INTRAVENOUS at 22:30

## 2024-01-05 RX ADMIN — SODIUM CHLORIDE: 9 INJECTION, SOLUTION INTRAVENOUS at 19:37

## 2024-01-05 RX ADMIN — CEFTRIAXONE SODIUM 1000 MG: 1 INJECTION, POWDER, FOR SOLUTION INTRAMUSCULAR; INTRAVENOUS at 10:04

## 2024-01-05 RX ADMIN — GLYCOPYRROLATE 0.2 MG: 0.2 INJECTION INTRAMUSCULAR; INTRAVENOUS at 13:10

## 2024-01-05 RX ADMIN — SODIUM CHLORIDE, PRESERVATIVE FREE 10 ML: 5 INJECTION INTRAVENOUS at 07:51

## 2024-01-05 RX ADMIN — SODIUM CHLORIDE, POTASSIUM CHLORIDE, SODIUM LACTATE AND CALCIUM CHLORIDE: 600; 310; 30; 20 INJECTION, SOLUTION INTRAVENOUS at 13:06

## 2024-01-05 ASSESSMENT — PAIN DESCRIPTION - LOCATION
LOCATION: ABDOMEN
LOCATION: ABDOMEN
LOCATION: HEAD

## 2024-01-05 ASSESSMENT — PAIN SCALES - GENERAL
PAINLEVEL_OUTOF10: 2
PAINLEVEL_OUTOF10: 6
PAINLEVEL_OUTOF10: 6

## 2024-01-05 ASSESSMENT — PAIN DESCRIPTION - DESCRIPTORS: DESCRIPTORS: ACHING

## 2024-01-05 ASSESSMENT — LIFESTYLE VARIABLES: SMOKING_STATUS: 1

## 2024-01-05 ASSESSMENT — PAIN DESCRIPTION - ORIENTATION: ORIENTATION: MID

## 2024-01-05 NOTE — ANESTHESIA POSTPROCEDURE EVALUATION
Department of Anesthesiology  Postprocedure Note    Patient: George West  MRN: [id number]  YOB: 1955  Date of evaluation: 1/5/2024    Procedure Summary     Date: 01/05/24 Room / Location: [facility] 02 / [facility] ENDOSCOPY    Anesthesia Start: 1308 Anesthesia Stop: 1345    Procedure: EGD ESOPHAGOGASTRODUODENOSCOPY (Upper GI Region) Diagnosis:       Gastrointestinal hemorrhage, unspecified gastrointestinal hemorrhage type      (Gastrointestinal hemorrhage, unspecified gastrointestinal hemorrhage type [K92.2])    Surgeons: Linh Olivier MD Responsible Provider: Marcus Ortega Jr., MD    Anesthesia Type: MAC, TIVA ASA Status: 4 - Emergent          Anesthesia Type: No value filed.    Edison Phase I:      Edison Phase II:      Anesthesia Post Evaluation    Patient location during evaluation: bedside (Endoscopy Unit)  Patient participation: complete - patient participated  Level of consciousness: sleepy but conscious  Pain score: 0  Airway patency: patent  Nausea & Vomiting: no nausea and no vomiting  Cardiovascular status: hemodynamically stable  Respiratory status: acceptable  Hydration status: stable  Comments: This patient remained on the stretcher.  The patient was handed off to the endoscopy nursing team.  All questions regarding pre-, intra-, and postoperative care were answered.  Multimodal analgesia pain management approach        No notable events documented.

## 2024-01-05 NOTE — PROGRESS NOTES
Patients HGB came back at 6.4. 1 unit PRBC ordered. Endo nurse called and said will be taking patient for EGD, with Dr. Olivier at 1500. To remain NPO. Type and screen sent waiting for blood from Blood bank. Let MD know morning BP was low MAP 66    1207 Unit PRBC started just received from blood bank     1300 Transferred to Endo

## 2024-01-05 NOTE — PROGRESS NOTES
PT consult received and acknowledged. PT eval attempted. However, pt with low HgB - 6.4. Hold PT eval at this time, will continue to  monitor and attempt for PT eval when medically appropriate. Thanks

## 2024-01-05 NOTE — PROGRESS NOTES
Pt. Transferred to room 221 via bed in stable condition.  Bedside report given, VALERYAR reveiwed.  Pt.'s family in room and supportive.  Blood finished. Vitals obtained. Pt. Reports does not want anymore Tylenol.

## 2024-01-05 NOTE — CARE COORDINATION
0820: Chart reviewed.    Per notes; patient followed via GI.    PT/OT evals pending discharge recs.    CM will continue to follow patient and recs of medical team.

## 2024-01-05 NOTE — ANESTHESIA PRE PROCEDURE
01/01/24 1305    polyethylene glycol (GLYCOLAX) packet 17 g  17 g Oral Daily PRN Brando Mccarty MD        acetaminophen (TYLENOL) tablet 650 mg  650 mg Oral Q6H PRN Brando Mccarty MD   650 mg at 01/05/24 0753    Or    acetaminophen (TYLENOL) suppository 650 mg  650 mg Rectal Q6H PRN Brando Mccarty MD           Allergies:  No Known Allergies    Problem List:    Patient Active Problem List   Diagnosis Code    Upper GI bleed K92.2    Hematemesis K92.0       Past Medical History:        Diagnosis Date    Asthma        Past Surgical History:        Procedure Laterality Date    UPPER GASTROINTESTINAL ENDOSCOPY N/A 1/1/2024    EGD ESOPHAGOGASTRODUODENOSCOPY performed by Linh Olivier MD at [facility] ENDOSCOPY    UPPER GASTROINTESTINAL ENDOSCOPY N/A 1/1/2024    EGD BIOPSY performed by Linh lOivier MD at [facility] ENDOSCOPY       Social History:    Social History     Tobacco Use    Smoking status: Every Day     Current packs/day: 0.50     Types: Cigarettes    Smokeless tobacco: Never   Substance Use Topics    Alcohol use: Yes     Alcohol/week: 1.0 standard drink of alcohol     Types: 1 Cans of beer per week                                Ready to quit: Not Answered  Counseling given: Not Answered      Vital Signs (Current):   Vitals:    01/05/24 0047 01/05/24 0429 01/05/24 0600 01/05/24 0749   BP: 95/62 (!) 93/51  (!) 92/53   Pulse: 67 71  68   Resp: 22 22  18   Temp: 98.4 °F (36.9 °C) 97.7 °F (36.5 °C)  100.2 °F (37.9 °C)   TempSrc: Oral Oral  Oral   SpO2: 100% 98%  100%   Weight:   69.9 kg (154 lb)    Height:                                                  BP Readings from Last 3 Encounters:   01/05/24 (!) 92/53   12/10/23 121/74       NPO Status:                                                                                 BMI:   Wt Readings from Last 3 Encounters:   01/05/24 69.9 kg (154 lb)   12/10/23 72.6 kg (160 lb)     Body mass index is 22.1 kg/m².    CBC:   Lab Results   Component Value Date/Time    WBC 7.9    Neuro/Psych:                ROS comment: EtOH dependency GI/Hepatic/Renal:            ROS comment: Hematemesis and dark tarry stools over past 24hrs.   Endo/Other:    (+) blood dyscrasia: anemia:..                  ROS comment: Severe anemia on admission (Hg 2.5), now up to 6.4 s/p RBC transfusion multiple units. Abdominal: normal exam            Vascular: negative vascular ROS.         Other Findings:             Anesthesia Plan      MAC and TIVA     ASA 4 - emergent       Induction: intravenous.  continuous noninvasive hemodynamic monitor    Anesthetic plan and risks discussed with patient.    Use of blood products discussed with patient whom consented to blood products.    Plan discussed with CRNA.    Attending anesthesiologist reviewed and agrees with Preprocedure content                Rajan De La Fuente MD   1/5/2024             V-Y Plasty Text: The defect edges were debeveled with a #15 scalpel blade.  Given the location of the defect, shape of the defect and the proximity to free margins an V-Y advancement flap was deemed most appropriate.  Using a sterile surgical marker, an appropriate advancement flap was drawn incorporating the defect and placing the expected incisions within the relaxed skin tension lines where possible.    The area thus outlined was incised deep to adipose tissue with a #15 scalpel blade.  The skin margins were undermined to an appropriate distance in all directions utilizing iris scissors.

## 2024-01-05 NOTE — PROGRESS NOTES
Collected: 01/01/24 0757    Order Status: Completed Specimen: Blood Updated: 01/05/24 0123     Special Requests No Special Requests        Culture No growth 4 days                CT CHEST ABDOMEN PELVIS W CONTRAST Additional Contrast? None    Addendum Date: 1/1/2024    Addendum: Addendum: Upon review, the stomach is distended with mixed densities including air, low density fluid, soft tissue density at 28 2, and hyperdensity in the region of the GE junction (image 60). This is concerning for a bleeding gastroesophageal varix (coronal image 53) in this clinical setting.    Result Date: 1/1/2024  No acute pulmonary process seen. Innumerable subcentimeter renal hypodensities, not further characterized. Cystitis    CT HEAD WO CONTRAST    Result Date: 1/1/2024  No acute findings.         Labs:     Recent Labs     01/04/24  0315 01/04/24  1205 01/04/24  1845 01/05/24  0512   WBC 7.5  --   --  7.9   HGB 7.3*  7.3*   < > 7.1* 6.4*   HCT 21.4*  21.4*   < > 21.3* 18.8*   *  --   --  145*    < > = values in this interval not displayed.       Recent Labs     01/03/24  0854 01/04/24  0320 01/05/24  0512    138 140   K 3.9 4.1 4.0   * 112* 111*   CO2 22 23 22   BUN 29* 29* 33*   CREATININE 1.23 1.14 1.17   GLUCOSE 147* 130* 139*   CALCIUM 6.9* 7.2* 7.4*       Recent Labs     01/03/24  0545 01/04/24  0320 01/05/24  0512   AST 15 27 23   ALT 14 26 26   ALKPHOS 28* 30* 38*   BILITOT <0.1* <0.1* 0.1*   LABALBU 1.8* 1.9* 1.8*   GLOB 1.7* 1.9* 2.0       No results for input(s): \"INR\", \"PROTIME\", \"APTT\" in the last 72 hours.     No results for input(s): \"IRON\", \"TIBC\", \"FERRITIN\" in the last 72 hours.    Invalid input(s): \"PSAT\"   No results found for: \"BNUQSIHE24\", \"FOLATE\", \"RBCF\"   No results for input(s): \"PH\", \"PCO2\", \"PO2\" in the last 72 hours.  Recent Labs     01/03/24  0854   TROPHS 10       No results found for: \"CHOL\", \"TRIG\", \"HDL\", \"LDLCHOLESTEROL\", \"LDLCALC\", \"LABVLDL\", \"VLDL\", \"CHOLHDLRATIO\"  Lab  acetaminophen (TYLENOL) suppository 650 mg, 650 mg, Rectal, Q6H PRN, Brando Mccarty MD

## 2024-01-06 LAB
ALBUMIN SERPL-MCNC: 1.8 G/DL (ref 3.5–5)
ALBUMIN/GLOB SERPL: 0.9 (ref 1.1–2.2)
ALP SERPL-CCNC: 39 U/L (ref 45–117)
ALT SERPL-CCNC: 21 U/L (ref 12–78)
ANION GAP SERPL CALC-SCNC: 3 MMOL/L (ref 5–15)
AST SERPL W P-5'-P-CCNC: 18 U/L (ref 15–37)
BILIRUB SERPL-MCNC: 0.2 MG/DL (ref 0.2–1)
BUN SERPL-MCNC: 33 MG/DL (ref 6–20)
BUN/CREAT SERPL: 31 (ref 12–20)
CA-I BLD-MCNC: 7.5 MG/DL (ref 8.5–10.1)
CHLORIDE SERPL-SCNC: 115 MMOL/L (ref 97–108)
CO2 SERPL-SCNC: 23 MMOL/L (ref 21–32)
CREAT SERPL-MCNC: 1.07 MG/DL (ref 0.7–1.3)
ERYTHROCYTE [DISTWIDTH] IN BLOOD BY AUTOMATED COUNT: 16.2 % (ref 11.5–14.5)
GLOBULIN SER CALC-MCNC: 2 G/DL (ref 2–4)
GLUCOSE SERPL-MCNC: 99 MG/DL (ref 65–100)
HCT VFR BLD AUTO: 18.8 % (ref 36.6–50.3)
HCT VFR BLD AUTO: 18.9 % (ref 36.6–50.3)
HCT VFR BLD AUTO: 19 % (ref 36.6–50.3)
HCT VFR BLD AUTO: 21.5 % (ref 36.6–50.3)
HGB BLD-MCNC: 6.4 G/DL (ref 12.1–17)
HGB BLD-MCNC: 6.4 G/DL (ref 12.1–17)
HGB BLD-MCNC: 6.5 G/DL (ref 12.1–17)
HGB BLD-MCNC: 7.3 G/DL (ref 12.1–17)
MCH RBC QN AUTO: 29.9 PG (ref 26–34)
MCHC RBC AUTO-ENTMCNC: 33.7 G/DL (ref 30–36.5)
MCV RBC AUTO: 88.8 FL (ref 80–99)
NRBC # BLD: 0.03 K/UL (ref 0–0.01)
NRBC BLD-RTO: 0.4 PER 100 WBC
PLATELET # BLD AUTO: 171 K/UL (ref 150–400)
PMV BLD AUTO: 10.4 FL (ref 8.9–12.9)
POTASSIUM SERPL-SCNC: 4 MMOL/L (ref 3.5–5.1)
PROT SERPL-MCNC: 3.8 G/DL (ref 6.4–8.2)
RBC # BLD AUTO: 2.14 M/UL (ref 4.1–5.7)
SODIUM SERPL-SCNC: 141 MMOL/L (ref 136–145)
WBC # BLD AUTO: 7.1 K/UL (ref 4.1–11.1)

## 2024-01-06 PROCEDURE — C9113 INJ PANTOPRAZOLE SODIUM, VIA: HCPCS | Performed by: INTERNAL MEDICINE

## 2024-01-06 PROCEDURE — 2580000003 HC RX 258: Performed by: FAMILY MEDICINE

## 2024-01-06 PROCEDURE — 6370000000 HC RX 637 (ALT 250 FOR IP): Performed by: FAMILY MEDICINE

## 2024-01-06 PROCEDURE — 2580000003 HC RX 258: Performed by: INTERNAL MEDICINE

## 2024-01-06 PROCEDURE — 6360000002 HC RX W HCPCS: Performed by: FAMILY MEDICINE

## 2024-01-06 PROCEDURE — P9016 RBC LEUKOCYTES REDUCED: HCPCS

## 2024-01-06 PROCEDURE — 85014 HEMATOCRIT: CPT

## 2024-01-06 PROCEDURE — 36430 TRANSFUSION BLD/BLD COMPNT: CPT

## 2024-01-06 PROCEDURE — 36415 COLL VENOUS BLD VENIPUNCTURE: CPT

## 2024-01-06 PROCEDURE — 85018 HEMOGLOBIN: CPT

## 2024-01-06 PROCEDURE — 2060000000 HC ICU INTERMEDIATE R&B

## 2024-01-06 PROCEDURE — 6370000000 HC RX 637 (ALT 250 FOR IP): Performed by: INTERNAL MEDICINE

## 2024-01-06 PROCEDURE — 85027 COMPLETE CBC AUTOMATED: CPT

## 2024-01-06 PROCEDURE — 80053 COMPREHEN METABOLIC PANEL: CPT

## 2024-01-06 PROCEDURE — 6360000002 HC RX W HCPCS: Performed by: INTERNAL MEDICINE

## 2024-01-06 RX ORDER — OXYCODONE HYDROCHLORIDE 5 MG/1
5 TABLET ORAL EVERY 4 HOURS PRN
Status: DISCONTINUED | OUTPATIENT
Start: 2024-01-06 | End: 2024-01-10 | Stop reason: HOSPADM

## 2024-01-06 RX ORDER — CLARITHROMYCIN 250 MG/1
500 TABLET, FILM COATED ORAL EVERY 12 HOURS SCHEDULED
Status: DISCONTINUED | OUTPATIENT
Start: 2024-01-06 | End: 2024-01-10 | Stop reason: HOSPADM

## 2024-01-06 RX ORDER — AMOXICILLIN 500 MG/1
1000 CAPSULE ORAL EVERY 12 HOURS SCHEDULED
Status: DISCONTINUED | OUTPATIENT
Start: 2024-01-06 | End: 2024-01-10 | Stop reason: HOSPADM

## 2024-01-06 RX ORDER — DEXTROSE AND SODIUM CHLORIDE 5; .9 G/100ML; G/100ML
INJECTION, SOLUTION INTRAVENOUS CONTINUOUS
Status: DISCONTINUED | OUTPATIENT
Start: 2024-01-06 | End: 2024-01-10 | Stop reason: HOSPADM

## 2024-01-06 RX ORDER — SODIUM CHLORIDE 9 MG/ML
INJECTION, SOLUTION INTRAVENOUS PRN
Status: DISCONTINUED | OUTPATIENT
Start: 2024-01-06 | End: 2024-01-10 | Stop reason: HOSPADM

## 2024-01-06 RX ADMIN — Medication 5 MG: at 20:59

## 2024-01-06 RX ADMIN — PANTOPRAZOLE SODIUM 40 MG: 40 INJECTION, POWDER, FOR SOLUTION INTRAVENOUS at 20:59

## 2024-01-06 RX ADMIN — CEFTRIAXONE SODIUM 1000 MG: 1 INJECTION, POWDER, FOR SOLUTION INTRAMUSCULAR; INTRAVENOUS at 09:09

## 2024-01-06 RX ADMIN — PANTOPRAZOLE SODIUM 40 MG: 40 INJECTION, POWDER, FOR SOLUTION INTRAVENOUS at 09:09

## 2024-01-06 RX ADMIN — CLARITHROMYCIN 500 MG: 250 TABLET ORAL at 20:59

## 2024-01-06 RX ADMIN — SODIUM CHLORIDE, PRESERVATIVE FREE 10 ML: 5 INJECTION INTRAVENOUS at 09:11

## 2024-01-06 RX ADMIN — SODIUM CHLORIDE, PRESERVATIVE FREE 10 ML: 5 INJECTION INTRAVENOUS at 20:59

## 2024-01-06 RX ADMIN — AMOXICILLIN 1000 MG: 500 CAPSULE ORAL at 20:59

## 2024-01-06 RX ADMIN — DEXTROSE AND SODIUM CHLORIDE: 5; 900 INJECTION, SOLUTION INTRAVENOUS at 18:11

## 2024-01-06 RX ADMIN — SODIUM CHLORIDE: 9 INJECTION, SOLUTION INTRAVENOUS at 07:33
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
## 2024-01-06 NOTE — PLAN OF CARE
Problem: Discharge Planning  Goal: Discharge to home or other facility with appropriate resources  Outcome: Progressing     Problem: Safety - Adult  Goal: Free from fall injury  Outcome: Progressing     Problem: Skin/Tissue Integrity - Adult  Goal: Skin integrity remains intact  Outcome: Progressing  Goal: Incisions, wounds, or drain sites healing without S/S of infection  Outcome: Progressing  Goal: Oral mucous membranes remain intact  Outcome: Progressing     Problem: Gastrointestinal - Adult  Goal: Minimal or absence of nausea and vomiting  Outcome: Progressing  Goal: Maintains or returns to baseline bowel function  Outcome: Progressing  Goal: Maintains adequate nutritional intake  Outcome: Progressing     Problem: Hematologic - Adult  Goal: Maintains hematologic stability  Outcome: Progressing     Problem: Pain  Goal: Verbalizes/displays adequate comfort level or baseline comfort level  Outcome: Progressing

## 2024-01-06 NOTE — PROGRESS NOTES
Progress Note    Date:2024       Room:[facility]/  Patient Name:George West     YOB: 1955     Age:68 y.o.    Assessment        Hospital Problems             Last Modified POA    * (Principal) Upper GI bleed 2024 Yes    Hematemesis 2024 Yes   Acute duodenal ulcer bleed  Severe anemia  Patient denies passing any bloody  Plan:      1.  Status post endoscopy with duodenal ulcer bleed  Transfused with packed red blood cells    Subjective   Interval History Status: not changed.     Denies Abdominal pain    Review of Systems   Negative except     Medications   Scheduled Meds:    cefTRIAXone (ROCEPHIN) IV  1,000 mg IntraVENous Q24H    pantoprazole  40 mg IntraVENous BID    sodium chloride  500 mL IntraVENous Once    sodium chloride flush  5-40 mL IntraVENous 2 times per day     Continuous Infusions:    sodium chloride      dextrose 5 % and 0.9 % NaCl      sodium chloride      sodium chloride      sodium chloride 125 mL/hr at 24 0733    sodium chloride       PRN Meds: sodium chloride, oxyCODONE, sodium chloride, melatonin, sodium chloride, sodium chloride flush, sodium chloride, ondansetron **OR** ondansetron, polyethylene glycol, acetaminophen **OR** acetaminophen    Past History    Past Medical History:   has a past medical history of Asthma.    Social History:   reports that he has been smoking cigarettes. He has never used smokeless tobacco. He reports current alcohol use of about 1.0 standard drink of alcohol per week. He reports that he does not use drugs.     Family History: History reviewed. No pertinent family history.    Physical Examination      Vitals:  BP (!) 93/52   Pulse 68   Temp 97.7 °F (36.5 °C)   Resp 17   Ht 1.778 m (5' 10\")   Wt 69.9 kg (154 lb)   SpO2 100%   BMI 22.10 kg/m²   Temp (24hrs), Av.3 °F (36.8 °C), Min:97.7 °F (36.5 °C), Max:99.1 °F (37.3 °C)      I/O (24Hr):    Intake/Output Summary (Last 24 hours) at 2024 1350  Last data filed at 2024        anesthesia care (MAC).  Immediately prior to administration of medications, the           patient was re-assessed for adequacy to receive sedatives.  The heart rate,           respiratory rate, oxygen saturations, blood pressure, adequacy of pulmonary           ventilation, and response to care were monitored throughout the procedure.  The           physical status of the patient was re-assessed after the procedure.        - The  was introduced through the mouth and advanced to the second part           of the duodenum.        -  The upper GI endoscopy was performed with difficulty due to excessive           bleeding.        -  The patient tolerated the procedure well. Findings:        -  The examined esophagus was normal.        -  Patchy mild inflammation characterized by erythema was found in the gastric           antrum.        -  Hematin (altered blood/coffee-ground-like material) was found in the           gastric fundus.        -  One oozing cratered duodenal ulcer with a visible vessel was found in the           second portion of the duodenum.  The lesion was 20 mm in largest dimension.           Area was successfully injected with 4 mL of a 0.1 mg/mL solution of epinephrine           for hemostasis.  To stop active bleeding, hemostatic spray was deployed.  Four           sprays were applied.  There was no bleeding at the end of the procedure. Impression:        -  Normal esophagus.        -  Gastritis, characterized by erythema.        -  Hematin (altered blood/coffee-ground-like material) in the gastric fundus.        -  Oozing duodenal ulcer with a visible vessel.  Hemostatic spray applied.           Injected.        -  No specimens collected. Recommendation:        - Monitoring HB closely, transfusion if below 7        -  Continue present medications. Procedure Code(s):        - 13059, Esophagogastroduodenoscopy, flexible, transoral; with control of           bleeding, any method Diagnosis

## 2024-01-07 LAB
BACTERIA SPEC CULT: NORMAL
BACTERIA SPEC CULT: NORMAL
HCT VFR BLD AUTO: 19.6 % (ref 36.6–50.3)
HCT VFR BLD AUTO: 22.8 % (ref 36.6–50.3)
HCT VFR BLD AUTO: 25.4 % (ref 36.6–50.3)
HGB BLD-MCNC: 6.6 G/DL (ref 12.1–17)
HGB BLD-MCNC: 7.6 G/DL (ref 12.1–17)
HGB BLD-MCNC: 8.5 G/DL (ref 12.1–17)
Lab: NORMAL
Lab: NORMAL

## 2024-01-07 PROCEDURE — 2580000003 HC RX 258: Performed by: INTERNAL MEDICINE

## 2024-01-07 PROCEDURE — 2060000000 HC ICU INTERMEDIATE R&B

## 2024-01-07 PROCEDURE — 36415 COLL VENOUS BLD VENIPUNCTURE: CPT

## 2024-01-07 PROCEDURE — 6370000000 HC RX 637 (ALT 250 FOR IP): Performed by: FAMILY MEDICINE

## 2024-01-07 PROCEDURE — 97530 THERAPEUTIC ACTIVITIES: CPT

## 2024-01-07 PROCEDURE — 36430 TRANSFUSION BLD/BLD COMPNT: CPT

## 2024-01-07 PROCEDURE — P9016 RBC LEUKOCYTES REDUCED: HCPCS

## 2024-01-07 PROCEDURE — 85014 HEMATOCRIT: CPT

## 2024-01-07 PROCEDURE — 6370000000 HC RX 637 (ALT 250 FOR IP): Performed by: INTERNAL MEDICINE

## 2024-01-07 PROCEDURE — 2580000003 HC RX 258: Performed by: FAMILY MEDICINE

## 2024-01-07 PROCEDURE — 97161 PT EVAL LOW COMPLEX 20 MIN: CPT

## 2024-01-07 PROCEDURE — 6360000002 HC RX W HCPCS: Performed by: INTERNAL MEDICINE

## 2024-01-07 PROCEDURE — C9113 INJ PANTOPRAZOLE SODIUM, VIA: HCPCS | Performed by: INTERNAL MEDICINE

## 2024-01-07 PROCEDURE — 85018 HEMOGLOBIN: CPT

## 2024-01-07 RX ADMIN — PANTOPRAZOLE SODIUM 40 MG: 40 INJECTION, POWDER, FOR SOLUTION INTRAVENOUS at 22:03

## 2024-01-07 RX ADMIN — CLARITHROMYCIN 500 MG: 250 TABLET ORAL at 10:41

## 2024-01-07 RX ADMIN — PANTOPRAZOLE SODIUM 40 MG: 40 INJECTION, POWDER, FOR SOLUTION INTRAVENOUS at 10:44

## 2024-01-07 RX ADMIN — CLARITHROMYCIN 500 MG: 250 TABLET ORAL at 22:03

## 2024-01-07 RX ADMIN — AMOXICILLIN 1000 MG: 500 CAPSULE ORAL at 22:02

## 2024-01-07 RX ADMIN — Medication 5 MG: at 22:02

## 2024-01-07 RX ADMIN — SODIUM CHLORIDE, PRESERVATIVE FREE 10 ML: 5 INJECTION INTRAVENOUS at 10:44

## 2024-01-07 RX ADMIN — DEXTROSE AND SODIUM CHLORIDE: 5; 900 INJECTION, SOLUTION INTRAVENOUS at 10:38

## 2024-01-07 RX ADMIN — AMOXICILLIN 1000 MG: 500 CAPSULE ORAL at 10:40

## 2024-01-07 RX ADMIN — SODIUM CHLORIDE, PRESERVATIVE FREE 10 ML: 5 INJECTION INTRAVENOUS at 22:03

## 2024-01-07 ASSESSMENT — PAIN SCALES - GENERAL: PAINLEVEL_OUTOF10: 0
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
## 2024-01-07 NOTE — PROGRESS NOTES
Progress Note    Date:2024       Room:[facility]/  Patient Name:George West     YOB: 1955     Age:68 y.o.    Assessment        Hospital Problems             Last Modified POA    * (Principal) Upper GI bleed 2024 Yes    Hematemesis 2024 Yes   Acute duodenal ulcer bleed  Severe anemia  Patient denies passing any bloody  Plan:      1.  Status post endoscopy with duodenal ulcer bleed  Transfused with packed red blood cells    Subjective   Interval History Status: not changed.     Denies Abdominal pain    At 2 units of packed red blood cells yesterday hemoglobin is up to 7.6 passing black stool possible old bleed  Review of Systems   Negative except     Medications   Scheduled Meds:    amoxicillin  1,000 mg Oral 2 times per day    clarithromycin  500 mg Oral 2 times per day    pantoprazole  40 mg IntraVENous BID    sodium chloride  500 mL IntraVENous Once    sodium chloride flush  5-40 mL IntraVENous 2 times per day     Continuous Infusions:    sodium chloride      dextrose 5 % and 0.9 % NaCl 75 mL/hr at 24 1038    sodium chloride       PRN Meds: sodium chloride, oxyCODONE, melatonin, sodium chloride flush, sodium chloride, ondansetron **OR** ondansetron, polyethylene glycol, acetaminophen **OR** acetaminophen    Past History    Past Medical History:   has a past medical history of Asthma.    Social History:   reports that he has been smoking cigarettes. He has never used smokeless tobacco. He reports current alcohol use of about 1.0 standard drink of alcohol per week. He reports that he does not use drugs.     Family History: History reviewed. No pertinent family history.    Physical Examination      Vitals:  BP (!) 102/57   Pulse 57   Temp 98.4 °F (36.9 °C)   Resp 20   Ht 1.778 m (5' 10\")   Wt 69.9 kg (154 lb)   SpO2 99%   BMI 22.10 kg/m²   Temp (24hrs), Av.8 °F (37.1 °C), Min:98.4 °F (36.9 °C), Max:99.2 °F (37.3 °C)      I/O (24Hr):    Intake/Output Summary (Last 24

## 2024-01-07 NOTE — PLAN OF CARE
PHYSICAL THERAPY EVALUATION  Patient: George West (68 y.o. male)  Date: 1/7/2024  Primary Diagnosis: Hematemesis [K92.0]  Upper GI bleed [K92.2]  Fall, initial encounter [W19.XXXA]  Procedure(s) (LRB):  EGD ESOPHAGOGASTRODUODENOSCOPY (N/A)  EGD CONTROL HEMORRHAGE (N/A) 2 Days Post-Op   Precautions: Fall Risk, General Precautions, Contact Precautions                Recommendations for nursing mobility: Use of BSC for toileting  and AD and gt belt for bed to chair     In place during session: Peripheral IV and EKG/telemetry     ASSESSMENT  Pt is a 68 y.o. male admitted on 1/1/2024 for hemaptasis; pt currently being treated for GIB,hgb 7.6 today . Pt semi supine  upon PT arrival, agreeable to evaluation. Pt A&O x .  Patient lives with sister.Has 2 sister and 1 is always there.    Based on the objective data described below, the patient currently presents with impaired ability to perform high-level IADLs, impaired strength, decreased activity tolerance, impaired cognition, impaired balance, and impaired posture. (See below for objective details and assist levels).     Overall pt tolerated session fair today with PT. Pt required sba for bed mobility and transfers. Pt amb 6 feet with CG, gt belt ; demonstrates slow transfer to the recliner due to weakness. Pt will benefit from continued skilled PT to address above deficits and return to PLOF. Potential barriers for safe discharge: . Current PT DC recommendation To Be Determined pending progress,may be able to return home.If not SNF,once medically appropriate.      GOALS:    Problem: Physical Therapy - Adult  Goal: By Discharge: Performs mobility at highest level of function for planned discharge setting.  See evaluation for individualized goals.  Description: FUNCTIONAL STATUS PRIOR TO ADMISSION: The patient  required minimal assistance for basic and instrumental ADLs.    HOME SUPPORT PRIOR TO ADMISSION: The patient lived with sister and required minimal assistance  without rails  Entrance Stairs - Number of Steps: 5 outside steps.  Bathroom Shower/Tub: Tub/Shower unit  Bathroom Toilet: Standard  Bathroom Accessibility: Accessible  Home Equipment: None  Receives Help From: Family  ADL Assistance: Independent  Homemaking Assistance: Independent  Homemaking Responsibilities: Yes  Ambulation Assistance: Independent  Transfer Assistance: Independent  Active : No  Occupation: Unemployed    Cognitive/Behavioral Status:  Orientation  Orientation Level: Oriented X4       Skin:     Edema    Strength:    Strength: Generally decreased, functional    Range Of Motion:  AROM: Generally decreased, functional       Coordination:        Tone & Sensation:   Tone: Normal         Functional Mobility:  Bed Mobility:     Bed Mobility Training  Bed Mobility Training: Yes  Overall Level of Assistance: Contact-guard assistance;Assist X1  Rolling: Contact-guard assistance;Additional time;Assist X1  Supine to Sit: Contact-guard assistance;Additional time;Assist X1  Sit to Supine: Contact-guard assistance;Additional time;Assist X1  Transfers:     Transfer Training  Transfer Training: Yes  Overall Level of Assistance: Minimum assistance;Contact-guard assistance  Sit to Stand: Minimum assistance;Contact-guard assistance  Stand to Sit: Minimum assistance;Contact-guard assistance  Balance:               Balance  Sitting: Intact  Standing: Impaired  Standing - Static: Good;Constant support  Standing - Dynamic: Fair;Constant support  Wheelchair Mobility:        Ambulation/Gait Training:     Gait Training: Yes                          Gait  Overall Level of Assistance: Minimum assistance  Distance (ft): 6 Feet  Assistive Device: Gait belt                   Therapeutic Exercises:   AP reviewed in recliner    Functional Measure:  [facility] AM-PAC™ “6 Clicks”         Basic Mobility Inpatient Short Form  How much difficulty does the patient currently have... Unable A Lot A Little None   1.  Turning

## 2024-01-08 LAB
ABO + RH BLD: NORMAL
BASOPHILS # BLD: 0 K/UL (ref 0–0.1)
BASOPHILS NFR BLD: 0 % (ref 0–1)
BLD PROD TYP BPU: NORMAL
BLOOD BANK DISPENSE STATUS: NORMAL
BLOOD GROUP ANTIBODIES SERPL: NEGATIVE
BPU ID: NORMAL
CROSSMATCH RESULT: NORMAL
DIFFERENTIAL METHOD BLD: ABNORMAL
EOSINOPHIL # BLD: 0.2 K/UL (ref 0–0.4)
EOSINOPHIL NFR BLD: 3 % (ref 0–7)
ERYTHROCYTE [DISTWIDTH] IN BLOOD BY AUTOMATED COUNT: 16.5 % (ref 11.5–14.5)
HCT VFR BLD AUTO: 21.7 % (ref 36.6–50.3)
HCT VFR BLD AUTO: 22.9 % (ref 36.6–50.3)
HCT VFR BLD AUTO: 23 % (ref 36.6–50.3)
HCT VFR BLD AUTO: 23.6 % (ref 36.6–50.3)
HGB BLD-MCNC: 7.4 G/DL (ref 12.1–17)
HGB BLD-MCNC: 7.6 G/DL (ref 12.1–17)
HGB BLD-MCNC: 7.6 G/DL (ref 12.1–17)
HGB BLD-MCNC: 7.9 G/DL (ref 12.1–17)
IMM GRANULOCYTES # BLD AUTO: 0 K/UL (ref 0–0.04)
IMM GRANULOCYTES NFR BLD AUTO: 0 % (ref 0–0.5)
LYMPHOCYTES # BLD: 1.4 K/UL (ref 0.8–3.5)
LYMPHOCYTES NFR BLD: 21 % (ref 12–49)
MCH RBC QN AUTO: 30.5 PG (ref 26–34)
MCHC RBC AUTO-ENTMCNC: 34.1 G/DL (ref 30–36.5)
MCV RBC AUTO: 89.3 FL (ref 80–99)
MONOCYTES # BLD: 0.7 K/UL (ref 0–1)
MONOCYTES NFR BLD: 11 % (ref 5–13)
NEUTS SEG # BLD: 4.6 K/UL (ref 1.8–8)
NEUTS SEG NFR BLD: 65 % (ref 32–75)
NRBC # BLD: 0 K/UL (ref 0–0.01)
NRBC BLD-RTO: 0 PER 100 WBC
PLATELET # BLD AUTO: 220 K/UL (ref 150–400)
PMV BLD AUTO: 10.4 FL (ref 8.9–12.9)
RBC # BLD AUTO: 2.43 M/UL (ref 4.1–5.7)
SPECIMEN EXP DATE BLD: NORMAL
TRANSFUSION STATUS PATIENT QL: NORMAL
UNIT DIVISION: 0
WBC # BLD AUTO: 6.9 K/UL (ref 4.1–11.1)

## 2024-01-08 PROCEDURE — 85014 HEMATOCRIT: CPT

## 2024-01-08 PROCEDURE — 36415 COLL VENOUS BLD VENIPUNCTURE: CPT

## 2024-01-08 PROCEDURE — 6360000002 HC RX W HCPCS: Performed by: INTERNAL MEDICINE

## 2024-01-08 PROCEDURE — 2060000000 HC ICU INTERMEDIATE R&B

## 2024-01-08 PROCEDURE — C9113 INJ PANTOPRAZOLE SODIUM, VIA: HCPCS | Performed by: INTERNAL MEDICINE

## 2024-01-08 PROCEDURE — 85025 COMPLETE CBC W/AUTO DIFF WBC: CPT

## 2024-01-08 PROCEDURE — 6370000000 HC RX 637 (ALT 250 FOR IP): Performed by: INTERNAL MEDICINE

## 2024-01-08 PROCEDURE — 85018 HEMOGLOBIN: CPT

## 2024-01-08 PROCEDURE — 2580000003 HC RX 258: Performed by: FAMILY MEDICINE

## 2024-01-08 RX ORDER — PANTOPRAZOLE SODIUM 40 MG/1
40 TABLET, DELAYED RELEASE ORAL
Status: DISCONTINUED | OUTPATIENT
Start: 2024-01-08 | End: 2024-01-10 | Stop reason: HOSPADM

## 2024-01-08 RX ADMIN — SODIUM CHLORIDE, PRESERVATIVE FREE 10 ML: 5 INJECTION INTRAVENOUS at 21:22

## 2024-01-08 RX ADMIN — AMOXICILLIN 1000 MG: 500 CAPSULE ORAL at 21:14

## 2024-01-08 RX ADMIN — SODIUM CHLORIDE, PRESERVATIVE FREE 10 ML: 5 INJECTION INTRAVENOUS at 09:02

## 2024-01-08 RX ADMIN — AMOXICILLIN 1000 MG: 500 CAPSULE ORAL at 09:02

## 2024-01-08 RX ADMIN — CLARITHROMYCIN 500 MG: 250 TABLET ORAL at 09:16

## 2024-01-08 RX ADMIN — CLARITHROMYCIN 500 MG: 250 TABLET ORAL at 21:14

## 2024-01-08 RX ADMIN — PANTOPRAZOLE SODIUM 40 MG: 40 INJECTION, POWDER, FOR SOLUTION INTRAVENOUS at 09:02

## 2024-01-08 RX ADMIN — PANTOPRAZOLE SODIUM 40 MG: 40 TABLET, DELAYED RELEASE ORAL at 17:06

## 2024-01-08 ASSESSMENT — PAIN DESCRIPTION - DESCRIPTORS
DESCRIPTORS: DISCOMFORT

## 2024-01-08 ASSESSMENT — PAIN - FUNCTIONAL ASSESSMENT: PAIN_FUNCTIONAL_ASSESSMENT: ACTIVITIES ARE NOT PREVENTED

## 2024-01-08 ASSESSMENT — PAIN SCALES - GENERAL
PAINLEVEL_OUTOF10: 1

## 2024-01-08 ASSESSMENT — PAIN DESCRIPTION - LOCATION
LOCATION: ABDOMEN

## 2024-01-08 ASSESSMENT — PAIN DESCRIPTION - ORIENTATION: ORIENTATION: MID;LOWER
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
## 2024-01-08 NOTE — PROGRESS NOTES
General Daily Progress Note      Patient Name:   George West       YOB: 1955       Age:  68 y.o.      Admit Date: 1/1/2024      Subjective:        Patient is a 68 y.o. year old male history of alcohol dependency came to emergency room complaining of hematemesis and rectal bleed  According to the patient, patient drinking one beer every single day  Black stool for the last 3 months and bleeding rectally since last night and having hematemesis     When I saw the patient resting the bed hemoglobin came back 2.5     I ordered 4 unit of stat blood transfusion     GI was consulted by the ER physician for emergent scope     Patient's sister at the bedside  For the blood work shows acute kidney injury with metabolic acidosis     Patient was started on a octreotide and Protonix     Patient admitted to ICU for further workup and     Patient on meloxicam and Motrin at home    1/2    Patient have endoscopy done shows gastritis and oozing duodenal ulcer    1/3    Patient's hemoglobin dropped to 5.7 this morning, and he became hypotensive. He was transfused with two units, and his blood pressure improved. Reports some tremors, but nothing significant. Abdominal pain in all 4 quadrants.    Patient have rapid response this morning blood pressure drops ordered 1 L IV fluids hemoglobin dropped to 5.7    1/4    Patient blood pressure drops as hemoglobin drops transferred to ICU received IV fluid boluses and blood transfusion this morning patient more alert awake family at the bedside hemoglobin is 7.3  CT scan of the abdomen which showed no sign of bleeding    1/5    Patient alert awake denies any chest pain shortness of breath nausea no vomiting hemoglobin dropped to 6.4    1/8  Patient alert and awake, denies chest pain, shortness of breath, and difficulty breathing. Had a bowel movement last night that was a \"black charcoal\" color. No blood on toilet paper or in the toilet. Received 2 units of packed RBCs  Culture No growth 6 days                CT CHEST ABDOMEN PELVIS W CONTRAST Additional Contrast? None    Addendum Date: 1/1/2024    Addendum: Addendum: Upon review, the stomach is distended with mixed densities including air, low density fluid, soft tissue density at 28 2, and hyperdensity in the region of the GE junction (image 60). This is concerning for a bleeding gastroesophageal varix (coronal image 53) in this clinical setting.    Result Date: 1/1/2024  No acute pulmonary process seen. Innumerable subcentimeter renal hypodensities, not further characterized. Cystitis    CT HEAD WO CONTRAST    Result Date: 1/1/2024  No acute findings.         Labs:     Recent Labs     01/06/24  0659 01/06/24  1129 01/08/24  0033 01/08/24  0542   WBC 7.1  --   --  6.9   HGB 6.4*   < > 7.9* 7.4*   HCT 19.0*   < > 23.6* 21.7*     --   --  220    < > = values in this interval not displayed.       Recent Labs     01/06/24  0659      K 4.0   *   CO2 23   BUN 33*   CREATININE 1.07   GLUCOSE 99   CALCIUM 7.5*       Recent Labs     01/06/24  0659   AST 18   ALT 21   ALKPHOS 39*   BILITOT 0.2   LABALBU 1.8*   GLOB 2.0       No results for input(s): \"INR\", \"PROTIME\", \"APTT\" in the last 72 hours.     No results for input(s): \"IRON\", \"TIBC\", \"FERRITIN\" in the last 72 hours.    Invalid input(s): \"PSAT\"   No results found for: \"WNFCILNG12\", \"FOLATE\", \"RBCF\"   No results for input(s): \"PH\", \"PCO2\", \"PO2\" in the last 72 hours.  No results for input(s): \"CKMB\", \"CKMBINDEX\", \"TROPHS\" in the last 72 hours.    Invalid input(s): \"TOTALCK\", \"TROPININ\"    No results found for: \"CHOL\", \"TRIG\", \"HDL\", \"LDLCHOLESTEROL\", \"LDLCALC\", \"LABVLDL\", \"VLDL\", \"CHOLHDLRATIO\"  Lab Results   Component Value Date/Time    POCGLU 111 01/03/2024 08:43 AM    POCGLU 263 01/01/2024 08:06 AM     No results found for: \"COLORU\", \"CLARITYU\", \"GLUCOSEU\", \"BILIRUBINUR\", \"KETUA\", \"SPECGRAV\", \"BLOODU\", \"PHUR\", \"PROTEINU\", \"NITRU\", \"LEUKOCYTESUR\"      Assessment:

## 2024-01-08 NOTE — CARE COORDINATION
Therapy recommended HH vs SNF.  Patient desires to go to SNF.  Verbal consent given for referrals to be sent to [facility].  Insurance auth is required.  Referrals sent pending insurance auth.       MELANY Louis  [phone]

## 2024-01-08 NOTE — PROGRESS NOTES
General Daily Progress Note      Patient Name:   George West       YOB: 1955       Age:  68 y.o.      Admit Date: 1/1/2024      Subjective:        Patient is a 68 y.o. year old male history of alcohol dependency came to emergency room complaining of hematemesis and rectal bleed  According to the patient, patient drinking one beer every single day  Black stool for the last 3 months and bleeding rectally since last night and having hematemesis     When I saw the patient resting the bed hemoglobin came back 2.5     I ordered 4 unit of stat blood transfusion     GI was consulted by the ER physician for emergent scope     Patient's sister at the bedside  For the blood work shows acute kidney injury with metabolic acidosis     Patient was started on a octreotide and Protonix     Patient admitted to ICU for further workup and     Patient on meloxicam and Motrin at home    1/2    Patient have endoscopy done shows gastritis and oozing duodenal ulcer    1/3    Patient's hemoglobin dropped to 5.7 this morning, and he became hypotensive. He was transfused with two units, and his blood pressure improved. Reports some tremors, but nothing significant. Abdominal pain in all 4 quadrants.    Patient have rapid response this morning blood pressure drops ordered 1 L IV fluids hemoglobin dropped to 5.7    1/4    Patient blood pressure drops as hemoglobin drops transferred to ICU received IV fluid boluses and blood transfusion this morning patient more alert awake family at the bedside hemoglobin is 7.3  CT scan of the abdomen which showed no sign of bleeding    1/5    Patient alert awake denies any chest pain shortness of breath nausea no vomiting hemoglobin dropped to 6.4    1/8  Patient alert and awake, denies chest pain, shortness of breath, and difficulty breathing. Had a bowel movement last night that was a \"black charcoal\" color. No blood on toilet paper or in the toilet. Received 2 units of packed RBCs  Culture No growth 6 days                CT CHEST ABDOMEN PELVIS W CONTRAST Additional Contrast? None    Addendum Date: 1/1/2024    Addendum: Addendum: Upon review, the stomach is distended with mixed densities including air, low density fluid, soft tissue density at 28 2, and hyperdensity in the region of the GE junction (image 60). This is concerning for a bleeding gastroesophageal varix (coronal image 53) in this clinical setting.    Result Date: 1/1/2024  No acute pulmonary process seen. Innumerable subcentimeter renal hypodensities, not further characterized. Cystitis    CT HEAD WO CONTRAST    Result Date: 1/1/2024  No acute findings.         Labs:     Recent Labs     01/06/24  0659 01/06/24  1129 01/08/24  0033 01/08/24  0542   WBC 7.1  --   --  6.9   HGB 6.4*   < > 7.9* 7.4*   HCT 19.0*   < > 23.6* 21.7*     --   --  220    < > = values in this interval not displayed.       Recent Labs     01/06/24  0659      K 4.0   *   CO2 23   BUN 33*   CREATININE 1.07   GLUCOSE 99   CALCIUM 7.5*       Recent Labs     01/06/24  0659   AST 18   ALT 21   ALKPHOS 39*   BILITOT 0.2   LABALBU 1.8*   GLOB 2.0       No results for input(s): \"INR\", \"PROTIME\", \"APTT\" in the last 72 hours.     No results for input(s): \"IRON\", \"TIBC\", \"FERRITIN\" in the last 72 hours.    Invalid input(s): \"PSAT\"   No results found for: \"PIQVHQWP53\", \"FOLATE\", \"RBCF\"   No results for input(s): \"PH\", \"PCO2\", \"PO2\" in the last 72 hours.  No results for input(s): \"CKMB\", \"CKMBINDEX\", \"TROPHS\" in the last 72 hours.    Invalid input(s): \"TOTALCK\", \"TROPININ\"    No results found for: \"CHOL\", \"TRIG\", \"HDL\", \"LDLCHOLESTEROL\", \"LDLCALC\", \"LABVLDL\", \"VLDL\", \"CHOLHDLRATIO\"  Lab Results   Component Value Date/Time    POCGLU 111 01/03/2024 08:43 AM    POCGLU 263 01/01/2024 08:06 AM     No results found for: \"COLORU\", \"CLARITYU\", \"GLUCOSEU\", \"BILIRUBINUR\", \"KETUA\", \"SPECGRAV\", \"BLOODU\", \"PHUR\", \"PROTEINU\", \"NITRU\", \"LEUKOCYTESUR\"      Assessment:

## 2024-01-09 LAB
ALBUMIN SERPL-MCNC: 1.8 G/DL (ref 3.5–5)
ALBUMIN/GLOB SERPL: 0.8 (ref 1.1–2.2)
ALP SERPL-CCNC: 50 U/L (ref 45–117)
ALT SERPL-CCNC: 18 U/L (ref 12–78)
ANION GAP SERPL CALC-SCNC: 4 MMOL/L (ref 5–15)
AST SERPL W P-5'-P-CCNC: 14 U/L (ref 15–37)
BASOPHILS # BLD: 0 K/UL (ref 0–0.1)
BASOPHILS NFR BLD: 0 % (ref 0–1)
BILIRUB SERPL-MCNC: 0.2 MG/DL (ref 0.2–1)
BUN SERPL-MCNC: 5 MG/DL (ref 6–20)
BUN/CREAT SERPL: 5 (ref 12–20)
CA-I BLD-MCNC: 7.8 MG/DL (ref 8.5–10.1)
CHLORIDE SERPL-SCNC: 109 MMOL/L (ref 97–108)
CO2 SERPL-SCNC: 27 MMOL/L (ref 21–32)
CREAT SERPL-MCNC: 0.98 MG/DL (ref 0.7–1.3)
DIFFERENTIAL METHOD BLD: ABNORMAL
EOSINOPHIL # BLD: 0.2 K/UL (ref 0–0.4)
EOSINOPHIL NFR BLD: 4 % (ref 0–7)
ERYTHROCYTE [DISTWIDTH] IN BLOOD BY AUTOMATED COUNT: 16.1 % (ref 11.5–14.5)
GLOBULIN SER CALC-MCNC: 2.4 G/DL (ref 2–4)
GLUCOSE SERPL-MCNC: 107 MG/DL (ref 65–100)
HCT VFR BLD AUTO: 22.3 % (ref 36.6–50.3)
HCT VFR BLD AUTO: 23.1 % (ref 36.6–50.3)
HGB BLD-MCNC: 7.4 G/DL (ref 12.1–17)
HGB BLD-MCNC: 7.6 G/DL (ref 12.1–17)
IMM GRANULOCYTES # BLD AUTO: 0 K/UL (ref 0–0.04)
IMM GRANULOCYTES NFR BLD AUTO: 1 % (ref 0–0.5)
LYMPHOCYTES # BLD: 1.2 K/UL (ref 0.8–3.5)
LYMPHOCYTES NFR BLD: 20 % (ref 12–49)
MCH RBC QN AUTO: 30.2 PG (ref 26–34)
MCHC RBC AUTO-ENTMCNC: 33.2 G/DL (ref 30–36.5)
MCV RBC AUTO: 91 FL (ref 80–99)
MONOCYTES # BLD: 0.6 K/UL (ref 0–1)
MONOCYTES NFR BLD: 11 % (ref 5–13)
NEUTS SEG # BLD: 3.9 K/UL (ref 1.8–8)
NEUTS SEG NFR BLD: 64 % (ref 32–75)
NRBC # BLD: 0 K/UL (ref 0–0.01)
NRBC BLD-RTO: 0 PER 100 WBC
PLATELET # BLD AUTO: 251 K/UL (ref 150–400)
PMV BLD AUTO: 10 FL (ref 8.9–12.9)
POTASSIUM SERPL-SCNC: 4.1 MMOL/L (ref 3.5–5.1)
PROT SERPL-MCNC: 4.2 G/DL (ref 6.4–8.2)
RBC # BLD AUTO: 2.45 M/UL (ref 4.1–5.7)
SODIUM SERPL-SCNC: 140 MMOL/L (ref 136–145)
WBC # BLD AUTO: 6.1 K/UL (ref 4.1–11.1)

## 2024-01-09 PROCEDURE — 80053 COMPREHEN METABOLIC PANEL: CPT

## 2024-01-09 PROCEDURE — 2060000000 HC ICU INTERMEDIATE R&B

## 2024-01-09 PROCEDURE — 2580000003 HC RX 258: Performed by: FAMILY MEDICINE

## 2024-01-09 PROCEDURE — 6370000000 HC RX 637 (ALT 250 FOR IP): Performed by: FAMILY MEDICINE

## 2024-01-09 PROCEDURE — 2580000003 HC RX 258: Performed by: INTERNAL MEDICINE

## 2024-01-09 PROCEDURE — 85018 HEMOGLOBIN: CPT

## 2024-01-09 PROCEDURE — 85014 HEMATOCRIT: CPT

## 2024-01-09 PROCEDURE — 6370000000 HC RX 637 (ALT 250 FOR IP): Performed by: INTERNAL MEDICINE

## 2024-01-09 PROCEDURE — 36415 COLL VENOUS BLD VENIPUNCTURE: CPT

## 2024-01-09 PROCEDURE — 85025 COMPLETE CBC W/AUTO DIFF WBC: CPT

## 2024-01-09 RX ORDER — CHOLECALCIFEROL (VITAMIN D3) 125 MCG
5 CAPSULE ORAL NIGHTLY PRN
Qty: 30 TABLET | Refills: 0 | Status: SHIPPED | OUTPATIENT
Start: 2024-01-09

## 2024-01-09 RX ORDER — 0.9 % SODIUM CHLORIDE 0.9 %
250 INTRAVENOUS SOLUTION INTRAVENOUS ONCE
Status: COMPLETED | OUTPATIENT
Start: 2024-01-09 | End: 2024-01-09

## 2024-01-09 RX ORDER — CLARITHROMYCIN 500 MG/1
500 TABLET, COATED ORAL EVERY 12 HOURS SCHEDULED
Qty: 22 TABLET | Refills: 0 | Status: SHIPPED | OUTPATIENT
Start: 2024-01-09 | End: 2024-01-20

## 2024-01-09 RX ORDER — PANTOPRAZOLE SODIUM 40 MG/1
40 TABLET, DELAYED RELEASE ORAL
Qty: 30 TABLET | Refills: 3 | Status: SHIPPED | OUTPATIENT
Start: 2024-01-09

## 2024-01-09 RX ORDER — AMOXICILLIN 500 MG/1
1000 CAPSULE ORAL EVERY 12 HOURS SCHEDULED
Qty: 44 CAPSULE | Refills: 0 | Status: SHIPPED | OUTPATIENT
Start: 2024-01-09 | End: 2024-01-20

## 2024-01-09 RX ADMIN — CLARITHROMYCIN 500 MG: 250 TABLET ORAL at 10:04

## 2024-01-09 RX ADMIN — CLARITHROMYCIN 500 MG: 250 TABLET ORAL at 21:37

## 2024-01-09 RX ADMIN — AMOXICILLIN 1000 MG: 500 CAPSULE ORAL at 10:04

## 2024-01-09 RX ADMIN — PANTOPRAZOLE SODIUM 40 MG: 40 TABLET, DELAYED RELEASE ORAL at 05:21

## 2024-01-09 RX ADMIN — Medication 5 MG: at 21:37

## 2024-01-09 RX ADMIN — DEXTROSE AND SODIUM CHLORIDE: 5; 900 INJECTION, SOLUTION INTRAVENOUS at 21:41

## 2024-01-09 RX ADMIN — PANTOPRAZOLE SODIUM 40 MG: 40 TABLET, DELAYED RELEASE ORAL at 18:01

## 2024-01-09 RX ADMIN — AMOXICILLIN 1000 MG: 500 CAPSULE ORAL at 21:36

## 2024-01-09 RX ADMIN — SODIUM CHLORIDE, PRESERVATIVE FREE 10 ML: 5 INJECTION INTRAVENOUS at 21:37

## 2024-01-09 RX ADMIN — SODIUM CHLORIDE 250 ML: 9 INJECTION, SOLUTION INTRAVENOUS at 08:33

## 2024-01-09 ASSESSMENT — PAIN DESCRIPTION - LOCATION
LOCATION: ABDOMEN

## 2024-01-09 ASSESSMENT — PAIN SCALES - GENERAL
PAINLEVEL_OUTOF10: 1
PAINLEVEL_OUTOF10: 2
PAINLEVEL_OUTOF10: 1
PAINLEVEL_OUTOF10: 1

## 2024-01-09 ASSESSMENT — PAIN - FUNCTIONAL ASSESSMENT
PAIN_FUNCTIONAL_ASSESSMENT: ACTIVITIES ARE NOT PREVENTED
PAIN_FUNCTIONAL_ASSESSMENT: ACTIVITIES ARE NOT PREVENTED

## 2024-01-09 ASSESSMENT — PAIN DESCRIPTION - DESCRIPTORS
DESCRIPTORS: DISCOMFORT
DESCRIPTORS: SORE

## 2024-01-09 ASSESSMENT — PAIN DESCRIPTION - ORIENTATION: ORIENTATION: MID;LOWER

## 2024-01-09 NOTE — PROGRESS NOTES
Comprehensive Nutrition Assessment    Type and Reason for Visit:  Initial, RD Nutrition Re-Screen/LOS    Nutrition Recommendations/Plan:   Advance diet as medically appropriate to GI soft  Add Thiamin IV 500mL 3x/d for 2xd + 250mg 1x/d for additional 5xd, then 100mg PO daily.  Add MVI  Add Ensure HP BID (320kcal, 32g pro)  Monitor/document wt, BMs, PO intake % in I/O  F/u wkly      Malnutrition Assessment:  Malnutrition Status:  Severe malnutrition (01/09/24 1242)    Context:  Acute Illness     Findings of the 6 clinical characteristics of malnutrition:  Energy Intake:  75% or less of estimated energy requirements for 7 or more days (NPO/Liquid diet)  Weight Loss:  No significant weight loss     Body Fat Loss:  Moderate body fat loss Buccal region   Muscle Mass Loss:  Moderate muscle mass loss Clavicles (pectoralis & deltoids), Hand (interosseous), Scapula (trapezius)  Fluid Accumulation:  Unable to assess     Strength:  Not Performed    Nutrition Assessment:    C/o hematemesis and black stool x3mo. Hgb 2.5 in ED s/p blood transfusion. Admit Upper GI bleed.  Noted SHAY w/ metabolic acidosis. CTA noted cystitis, GI bleed, renal hypodensities. NPO intermittent 2/2 EGD. (1/2) EGD indicated gastritis and oozing duodenal ulcer. (1/3) Hgb 5.7, became hypotensive, s/p transfusion. (1/4) Xfer to ICU s/p transfusion, CTA noted no bleeding. (1/5) EGD completed and transferred from ICU to floor. (1/8) C/o abd pain, black stool. Diet advanced to full liquids x4 days. Per RN, he has requested applesauce, pudding consuming 100%. Spoke w/ pt who reported he was ready for solid foods preferably chicken noodle soup. Spoke w/ RN who messaged MD re: diet advancement to GI soft. At home he meal preps large salads incl steak/chicken/fish w/ eggs, raisins, variety vegs. No ONS at home but agreeable to try. No wt loss per UBW. Pt d/c to skilled rehab. Receiving D5 @ 75ml/hr (306kcal). Labs and meds reviewed.  Cl 109, BUN/Cre 5, Ca 7.8,

## 2024-01-09 NOTE — PROGRESS NOTES
Spiritual Care Assessment/Progress Note  [facility]    Name: Geogre West MRN: [id number]    Age: 68 y.o.     Sex: male   Language: English     Date: 1/9/2024            Total Time Calculated: 24 min              Spiritual Assessment begun in [facility] [place]  Service Provided For:: Patient  Referral/Consult From:: Rounding  Encounter Overview/Reason : Initial Encounter    Spiritual beliefs:      [x] Involved in a agustín tradition/spiritual practice: Presybeterian     [] Supported by a agustín community:      [] Claims no spiritual orientation:      [] Seeking spiritual identity:           [] Adheres to an individual form of spirituality:      [] Not able to assess:                Identified resources for coping and support system:   Support System: Family members       [x] Prayer                  [x] Devotional reading               [] Music                  [] Guided Imagery     [] Pet visits                                        [] Other: (COMMENT)     Specific area/focus of visit   Encounter:    Crisis:    Spiritual/Emotional needs: Type: Spiritual Support  Ritual, Rites and Sacraments: Type: Blessings  Grief, Loss, and Adjustments:    Ethics/Mediation:    Behavioral Health:    Palliative Care:    Advance Care Planning:      Plan/Referrals: Other (Comment) ( is available if needed)    Narrative: Rounding visit in [facility]. Patient was present during the visit.  explored pt's feelings, thoughts, and support system. Patient and  also discussed about his meaningful and purposeful life. Patient expressed his Denominational agustín.  continued to provide a peaceful presence with an active listening.  offered a prayer with reading scriptures at the patient's request.     One of patient's coping mechanism is reading scriptures, but he forgot to bring it. So,  plans to bring the Bible this afternoon. Patient expressed his appreciation for 's visit and prayer.

## 2024-01-09 NOTE — CARE COORDINATION
Accepted at both [place] and [place].  Patient selected [facility].  Insurance auth is required.  Requested for admissions to start auth.       MELANY Louis

## 2024-01-09 NOTE — DISCHARGE SUMMARY
Discharge Summary       PATIENT ID: George West  MRN: [id number]   YOB: 1955    DATE OF ADMISSION: 1/1/2024   DATE OF DISCHARGE:   PRIMARY CARE PROVIDER: [unfilled]      ATTENDING PHYSICIAN: Brando Mccarty  DISCHARGING PROVIDER: Brando Mccarty      CONSULTATIONS: IP CONSULT TO GI  IP CONSULT TO PHYSICAL THERAPY  IP CONSULT TO OCCUPATIONAL THERAPY  IP CONSULT TO CASE MANAGEMENT    PROCEDURES/SURGERIES: Procedure(s) with comments:  EGD ESOPHAGOGASTRODUODENOSCOPY  EGD CONTROL HEMORRHAGE - 6 cc of epinephrine injected into duodenal ulcer    ADMITTING DIAGNOSES:    Patient Active Problem List    Diagnosis Date Noted    Upper GI bleed 01/01/2024    Hematemesis 01/01/2024       DISCHARGE DIAGNOSES / PLAN:      Acute GI bleed  Symptomatic anemia  Alcohol dependency  Acute kidney injury  Metabolic acidosis  Hypotension  Gastritis with duodenal ulcer        DISCHARGE MEDICATIONS:     Medication List        START taking these medications      amoxicillin 500 MG capsule  Commonly known as: AMOXIL  Take 2 capsules by mouth every 12 hours for 22 doses     clarithromycin 500 MG tablet  Commonly known as: BIAXIN  Take 1 tablet by mouth every 12 hours for 22 doses     melatonin 5 MG Tabs tablet  Take 1 tablet by mouth nightly as needed (30)     pantoprazole 40 MG tablet  Commonly known as: PROTONIX  Take 1 tablet by mouth 2 times daily (before meals)            STOP taking these medications      ibuprofen 600 MG tablet  Commonly known as: ADVIL;MOTRIN     methocarbamol 750 MG tablet  Commonly known as: Robaxin-750            ASK your doctor about these medications      ondansetron 4 MG disintegrating tablet  Commonly known as: ZOFRAN-ODT  Take 1 tablet by mouth 3 times daily as needed for Nausea or Vomiting  Ask about: Should I take this medication?               Where to Get Your Medications        These medications were sent to [facility] #[id number] [place], [place] - [address]  ABDOMEN PELVIS W CONTRAST Additional Contrast? None   Final Result   Addendum (preliminary) 1 of 1   Addendum: Addendum: Upon review, the stomach is distended with mixed    densities   including air, low density fluid, soft tissue density at 28 2, and    hyperdensity   in the region of the GE junction (image 60). This is concerning for a    bleeding   gastroesophageal varix (coronal image 53) in this clinical setting.      Final   No acute pulmonary process seen.   Innumerable subcentimeter renal hypodensities, not further characterized.   Cystitis      CT HEAD WO CONTRAST   Final Result   No acute findings.           Recent Results (from the past 24 hour(s))   Hemoglobin and Hematocrit    Collection Time: 01/08/24  1:17 PM   Result Value Ref Range    Hemoglobin 7.6 (L) 12.1 - 17.0 g/dL    Hematocrit 23.0 (L) 36.6 - 50.3 %   Hemoglobin and Hematocrit    Collection Time: 01/08/24 10:29 PM   Result Value Ref Range    Hemoglobin 7.6 (L) 12.1 - 17.0 g/dL    Hematocrit 22.9 (L) 36.6 - 50.3 %   CBC with Auto Differential    Collection Time: 01/09/24  7:17 AM   Result Value Ref Range    WBC 6.1 4.1 - 11.1 K/uL    RBC 2.45 (L) 4.10 - 5.70 M/uL    Hemoglobin 7.4 (L) 12.1 - 17.0 g/dL    Hematocrit 22.3 (L) 36.6 - 50.3 %    MCV 91.0 80.0 - 99.0 FL    MCH 30.2 26.0 - 34.0 PG    MCHC 33.2 30.0 - 36.5 g/dL    RDW 16.1 (H) 11.5 - 14.5 %    Platelets 251 150 - 400 K/uL    MPV 10.0 8.9 - 12.9 FL    Nucleated RBCs 0.0 0.0  WBC    nRBC 0.00 0.00 - 0.01 K/uL    Neutrophils % 64 32 - 75 %    Lymphocytes % 20 12 - 49 %    Monocytes % 11 5 - 13 %    Eosinophils % 4 0 - 7 %    Basophils % 0 0 - 1 %    Immature Granulocytes 1 (H) 0 - 0.5 %    Neutrophils Absolute 3.9 1.8 - 8.0 K/UL    Lymphocytes Absolute 1.2 0.8 - 3.5 K/UL    Monocytes Absolute 0.6 0.0 - 1.0 K/UL    Eosinophils Absolute 0.2 0.0 - 0.4 K/UL    Basophils Absolute 0.0 0.0 - 0.1 K/UL    Absolute Immature Granulocyte 0.0 0.00 - 0.04 K/UL    Differential Type AUTOMATED
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
## 2024-01-10 VITALS
OXYGEN SATURATION: 100 % | HEART RATE: 80 BPM | TEMPERATURE: 98.1 F | DIASTOLIC BLOOD PRESSURE: 62 MMHG | SYSTOLIC BLOOD PRESSURE: 109 MMHG | HEIGHT: 70 IN | BODY MASS INDEX: 23.07 KG/M2 | RESPIRATION RATE: 18 BRPM | WEIGHT: 161.16 LBS

## 2024-01-10 LAB
BASOPHILS # BLD: 0 K/UL (ref 0–0.1)
BASOPHILS NFR BLD: 0 % (ref 0–1)
DIFFERENTIAL METHOD BLD: ABNORMAL
EOSINOPHIL # BLD: 0.2 K/UL (ref 0–0.4)
EOSINOPHIL NFR BLD: 4 % (ref 0–7)
ERYTHROCYTE [DISTWIDTH] IN BLOOD BY AUTOMATED COUNT: 16 % (ref 11.5–14.5)
HCT VFR BLD AUTO: 22.5 % (ref 36.6–50.3)
HGB BLD-MCNC: 7.2 G/DL (ref 12.1–17)
IMM GRANULOCYTES # BLD AUTO: 0 K/UL (ref 0–0.04)
IMM GRANULOCYTES NFR BLD AUTO: 0 % (ref 0–0.5)
LYMPHOCYTES # BLD: 1.2 K/UL (ref 0.8–3.5)
LYMPHOCYTES NFR BLD: 20 % (ref 12–49)
MCH RBC QN AUTO: 29.1 PG (ref 26–34)
MCHC RBC AUTO-ENTMCNC: 32 G/DL (ref 30–36.5)
MCV RBC AUTO: 91.1 FL (ref 80–99)
MONOCYTES # BLD: 0.7 K/UL (ref 0–1)
MONOCYTES NFR BLD: 11 % (ref 5–13)
NEUTS SEG # BLD: 3.9 K/UL (ref 1.8–8)
NEUTS SEG NFR BLD: 65 % (ref 32–75)
NRBC # BLD: 0 K/UL (ref 0–0.01)
NRBC BLD-RTO: 0 PER 100 WBC
PLATELET # BLD AUTO: 274 K/UL (ref 150–400)
PMV BLD AUTO: 9.7 FL (ref 8.9–12.9)
RBC # BLD AUTO: 2.47 M/UL (ref 4.1–5.7)
WBC # BLD AUTO: 6.1 K/UL (ref 4.1–11.1)

## 2024-01-10 PROCEDURE — 97116 GAIT TRAINING THERAPY: CPT

## 2024-01-10 PROCEDURE — 6370000000 HC RX 637 (ALT 250 FOR IP): Performed by: INTERNAL MEDICINE

## 2024-01-10 PROCEDURE — 85025 COMPLETE CBC W/AUTO DIFF WBC: CPT

## 2024-01-10 PROCEDURE — 2580000003 HC RX 258: Performed by: FAMILY MEDICINE

## 2024-01-10 PROCEDURE — 36415 COLL VENOUS BLD VENIPUNCTURE: CPT

## 2024-01-10 PROCEDURE — 2580000003 HC RX 258: Performed by: INTERNAL MEDICINE

## 2024-01-10 RX ADMIN — SODIUM CHLORIDE, PRESERVATIVE FREE 10 ML: 5 INJECTION INTRAVENOUS at 10:08

## 2024-01-10 RX ADMIN — DEXTROSE AND SODIUM CHLORIDE: 5; 900 INJECTION, SOLUTION INTRAVENOUS at 11:18

## 2024-01-10 RX ADMIN — CLARITHROMYCIN 500 MG: 250 TABLET ORAL at 10:06

## 2024-01-10 RX ADMIN — PANTOPRAZOLE SODIUM 40 MG: 40 TABLET, DELAYED RELEASE ORAL at 06:25

## 2024-01-10 RX ADMIN — PANTOPRAZOLE SODIUM 40 MG: 40 TABLET, DELAYED RELEASE ORAL at 16:17

## 2024-01-10 RX ADMIN — AMOXICILLIN 1000 MG: 500 CAPSULE ORAL at 10:06

## 2024-01-10 ASSESSMENT — PAIN SCALES - GENERAL: PAINLEVEL_OUTOF10: 0

## 2024-01-10 NOTE — PLAN OF CARE
Problem: Physical Therapy - Adult  Goal: By Discharge: Performs mobility at highest level of function for planned discharge setting.  See evaluation for individualized goals.  Description: FUNCTIONAL STATUS PRIOR TO ADMISSION: The patient  required minimal assistance for basic and instrumental ADLs.    HOME SUPPORT PRIOR TO ADMISSION: The patient lived with sister and required minimal assistance for ADLS at time.Patient reports 1 sister is always there with him..    Physical Therapy Goals  Initiated 1/7/2024  Pt stated goal: get better  Pt will be I with LE HEP in 7 days.  Pt will perform bed mobility with Modified Wallace in 7 days.  Pt will perform transfers with Modified Wallace in 7 days.   Pt will amb 100 feet with LRAD safely with Stand by Assist in 7 days.    Pt will verbalize and demonstrate compliance with fall precautions  in 7 days.   Pt will demonstrate improvement in standing/gait balance from fair to good in 7 days.    1/10/2024 1009 by Hawa Noel, PTA  Outcome: Progressing

## 2024-01-10 NOTE — CARE COORDINATION
0825: Discharge summary/order noted.    AUTH pending at [facility].    1340: AUTH remains pending.    Updates attached in [facility].    1407: [facility] received AUTH.    CM met with patient to inform of above.    Patient wants to go home with home health per therapy recs this morning.    Order updated.    Choice letter received, placed on chart and referrals sent.    When transportation available, patient to discharge per order.    Discharge Checklist Completed.    Transition of Care Plan:    RUR: 13%  Prior Level of Functioning: Independent  Disposition: Home with HH  If SNF or IPR: Date FOC offered: 01/09/2024  Date FOC received: 01/09/2024  Accepting facility: N/A  Date authorization started with reference number: 01/09/2024  Date authorization received and expires: 01/10/2024  Follow up appointments: Discharge Summary  DME needed: None  Transportation at discharge: [place]  IM/IMM Medicare/Selene letter given: 01/10/2024  Is patient a  and connected with [facility]? No  If yes, was [place] transfer form completed and [facility] notified? N/A  Caregiver Contact: Patient  Discharge Caregiver contacted prior to discharge? Patient  Care Conference needed? No  Barriers to discharge: None

## 2024-01-10 NOTE — PLAN OF CARE
Problem: Discharge Planning  Goal: Discharge to home or other facility with appropriate resources  Outcome: Progressing  Flowsheets (Taken 1/9/2024 0910 by Quynh Marks, RN)  Discharge to home or other facility with appropriate resources:   Identify barriers to discharge with patient and caregiver   Arrange for needed discharge resources and transportation as appropriate   Identify discharge learning needs (meds, wound care, etc)     Problem: Safety - Adult  Goal: Free from fall injury  Outcome: Progressing     Problem: Skin/Tissue Integrity - Adult  Goal: Skin integrity remains intact  Outcome: Progressing  Flowsheets (Taken 1/9/2024 0910 by Quynh Marks, RN)  Skin Integrity Remains Intact:   Monitor for areas of redness and/or skin breakdown   Assess vascular access sites hourly  Goal: Incisions, wounds, or drain sites healing without S/S of infection  Outcome: Progressing  Flowsheets (Taken 1/9/2024 0910 by Quynh Marks, RN)  Incisions, Wounds, or Drain Sites Healing Without Sign and Symptoms of Infection:   ADMISSION and DAILY: Assess and document risk factors for pressure ulcer development   TWICE DAILY: Assess and document skin integrity   TWICE DAILY: Assess and document dressing/incision, wound bed, drain sites and surrounding tissue   Implement wound care per orders  Goal: Oral mucous membranes remain intact  Outcome: Progressing  Flowsheets (Taken 1/9/2024 0910 by Quynh Marks, RN)  Oral Mucous Membranes Remain Intact:   Assess oral mucosa and hygiene practices   Implement preventative oral hygiene regimen   Implement oral medicated treatments as ordered     Problem: Gastrointestinal - Adult  Goal: Minimal or absence of nausea and vomiting  Outcome: Progressing  Flowsheets (Taken 1/9/2024 0910 by Quynh Marks, RN)  Minimal or absence of nausea and vomiting:   Administer IV fluids as ordered to ensure adequate hydration   Nasogastric tube to low intermittent suction as ordered

## 2024-01-10 NOTE — DISCHARGE SUMMARY
Discharge Summary       PATIENT ID: George West  MRN: [id number]   YOB: 1955    DATE OF ADMISSION: 1/1/2024   DATE OF DISCHARGE:   PRIMARY CARE PROVIDER: [unfilled]      ATTENDING PHYSICIAN: Brando Mccarty  DISCHARGING PROVIDER: Brando Mccarty      CONSULTATIONS: IP CONSULT TO GI  IP CONSULT TO PHYSICAL THERAPY  IP CONSULT TO OCCUPATIONAL THERAPY  IP CONSULT TO CASE MANAGEMENT    PROCEDURES/SURGERIES: Procedure(s) with comments:  EGD ESOPHAGOGASTRODUODENOSCOPY  EGD CONTROL HEMORRHAGE - 6 cc of epinephrine injected into duodenal ulcer    ADMITTING DIAGNOSES:    Patient Active Problem List    Diagnosis Date Noted    Upper GI bleed 01/01/2024    Hematemesis 01/01/2024       DISCHARGE DIAGNOSES / PLAN:      Acute GI bleed  Symptomatic anemia  Alcohol dependency  Acute kidney injury  Metabolic acidosis  Hypotension  Gastritis with duodenal ulcer        DISCHARGE MEDICATIONS:     Medication List        START taking these medications      amoxicillin 500 MG capsule  Commonly known as: AMOXIL  Take 2 capsules by mouth every 12 hours for 22 doses     clarithromycin 500 MG tablet  Commonly known as: BIAXIN  Take 1 tablet by mouth every 12 hours for 22 doses     melatonin 5 MG Tabs tablet  Take 1 tablet by mouth nightly as needed (30)     pantoprazole 40 MG tablet  Commonly known as: PROTONIX  Take 1 tablet by mouth 2 times daily (before meals)            STOP taking these medications      ibuprofen 600 MG tablet  Commonly known as: ADVIL;MOTRIN     methocarbamol 750 MG tablet  Commonly known as: Robaxin-750            ASK your doctor about these medications      ondansetron 4 MG disintegrating tablet  Commonly known as: ZOFRAN-ODT  Take 1 tablet by mouth 3 times daily as needed for Nausea or Vomiting  Ask about: Should I take this medication?               Where to Get Your Medications        These medications were sent to [facility] #[id number] [place], [place] - [address]

## 2024-01-10 NOTE — PLAN OF CARE
PHYSICAL THERAPY TREATMENT     Patient: George West (68 y.o. male)  Date: 1/10/2024  Diagnosis: Hematemesis [K92.0]  Upper GI bleed [K92.2]  Fall, initial encounter [W19.XXXA] Upper GI bleed  Procedure(s) (LRB):  EGD ESOPHAGOGASTRODUODENOSCOPY (N/A)  EGD CONTROL HEMORRHAGE (N/A) 5 Days Post-Op  Precautions:  Fall Risk, General Precautions, Contact Precautions                Recommendations for nursing mobility: Out of bed to chair for meals, Encourage HEP in prep for ADLs/mobility; see handout for details, AD and gt belt for bed to chair , Amb to bathroom with AD and gait belt, and Assist x1    In place during session: Peripheral IV and EKG/telemetry   Chart, physical therapy assessment, plan of care and goals were reviewed.  ASSESSMENT  Patient continues with skilled PT services and is progressing towards goals. Pt in bed upon PT arrival, agreeable to session. Pt A&O x 4. (See below for objective details and assist levels).     Overall pt tolerated session well today with improved functional mobility. Patient completed all mobility at supervision level, limited gait distance due to BP. Pt maintained BP in 80s-90s/50s, see chart below. Patient amb with no LOB noted approx 25 ft then took another 15 ft after seated rest break to assess vitals. Patient c/o dizziness throughout session but not worsening with mobility. Held further gait distance secondary to dizziness. Reviewed LE therex in the recliner and pt demonstrated and verbalized understanding.   Will continue to benefit from skilled PT services, and will continue to progress as tolerated. Potential barriers for safe discharge: concern for pt safely navigating or managing the home environment. Current PT DC recommendation Home with Home Health Therapy once medically appropriate.     Start of Session End of Session   SPO2 (%) 100 100   Heart Rate (BPM) 64 68   BP 94/55  87/66  85/53    GOALS:    Problem: Physical Therapy - Adult  Goal: By Discharge:

## 2024-01-10 NOTE — DISCHARGE SUMMARY
Discharge Summary       PATIENT ID: George West  MRN: [id number]   YOB: 1955    DATE OF ADMISSION: 1/1/2024   DATE OF DISCHARGE:   PRIMARY CARE PROVIDER: [unfilled]      ATTENDING PHYSICIAN: Brando Mccarty  DISCHARGING PROVIDER: Brando Mccarty      CONSULTATIONS: IP CONSULT TO GI  IP CONSULT TO PHYSICAL THERAPY  IP CONSULT TO OCCUPATIONAL THERAPY  IP CONSULT TO CASE MANAGEMENT    PROCEDURES/SURGERIES: Procedure(s) with comments:  EGD ESOPHAGOGASTRODUODENOSCOPY  EGD CONTROL HEMORRHAGE - 6 cc of epinephrine injected into duodenal ulcer    ADMITTING DIAGNOSES:    Patient Active Problem List    Diagnosis Date Noted    Upper GI bleed 01/01/2024    Hematemesis 01/01/2024       DISCHARGE DIAGNOSES / PLAN:      Acute GI bleed  Symptomatic anemia  Alcohol dependency  Acute kidney injury  Metabolic acidosis  Hypotension  Gastritis with duodenal ulcer        DISCHARGE MEDICATIONS:     Medication List        START taking these medications      amoxicillin 500 MG capsule  Commonly known as: AMOXIL  Take 2 capsules by mouth every 12 hours for 22 doses     clarithromycin 500 MG tablet  Commonly known as: BIAXIN  Take 1 tablet by mouth every 12 hours for 22 doses     melatonin 5 MG Tabs tablet  Take 1 tablet by mouth nightly as needed (30)     pantoprazole 40 MG tablet  Commonly known as: PROTONIX  Take 1 tablet by mouth 2 times daily (before meals)            STOP taking these medications      ibuprofen 600 MG tablet  Commonly known as: ADVIL;MOTRIN     methocarbamol 750 MG tablet  Commonly known as: Robaxin-750            ASK your doctor about these medications      ondansetron 4 MG disintegrating tablet  Commonly known as: ZOFRAN-ODT  Take 1 tablet by mouth 3 times daily as needed for Nausea or Vomiting  Ask about: Should I take this medication?               Where to Get Your Medications        These medications were sent to [facility] #[id number] [place], [place] - [address]  ABDOMEN PELVIS W CONTRAST Additional Contrast? None   Final Result   Addendum (preliminary) 1 of 1   Addendum: Addendum: Upon review, the stomach is distended with mixed    densities   including air, low density fluid, soft tissue density at 28 2, and    hyperdensity   in the region of the GE junction (image 60). This is concerning for a    bleeding   gastroesophageal varix (coronal image 53) in this clinical setting.      Final   No acute pulmonary process seen.   Innumerable subcentimeter renal hypodensities, not further characterized.   Cystitis      CT HEAD WO CONTRAST   Final Result   No acute findings.           Recent Results (from the past 24 hour(s))   Hemoglobin and Hematocrit    Collection Time: 01/09/24  2:04 PM   Result Value Ref Range    Hemoglobin 7.6 (L) 12.1 - 17.0 g/dL    Hematocrit 23.1 (L) 36.6 - 50.3 %   CBC with Auto Differential    Collection Time: 01/10/24  6:18 AM   Result Value Ref Range    WBC 6.1 4.1 - 11.1 K/uL    RBC 2.47 (L) 4.10 - 5.70 M/uL    Hemoglobin 7.2 (L) 12.1 - 17.0 g/dL    Hematocrit 22.5 (L) 36.6 - 50.3 %    MCV 91.1 80.0 - 99.0 FL    MCH 29.1 26.0 - 34.0 PG    MCHC 32.0 30.0 - 36.5 g/dL    RDW 16.0 (H) 11.5 - 14.5 %    Platelets 274 150 - 400 K/uL    MPV 9.7 8.9 - 12.9 FL    Nucleated RBCs 0.0 0.0  WBC    nRBC 0.00 0.00 - 0.01 K/uL    Neutrophils % 65 32 - 75 %    Lymphocytes % 20 12 - 49 %    Monocytes % 11 5 - 13 %    Eosinophils % 4 0 - 7 %    Basophils % 0 0 - 1 %    Immature Granulocytes 0 0 - 0.5 %    Neutrophils Absolute 3.9 1.8 - 8.0 K/UL    Lymphocytes Absolute 1.2 0.8 - 3.5 K/UL    Monocytes Absolute 0.7 0.0 - 1.0 K/UL    Eosinophils Absolute 0.2 0.0 - 0.4 K/UL    Basophils Absolute 0.0 0.0 - 0.1 K/UL    Absolute Immature Granulocyte 0.0 0.00 - 0.04 K/UL    Differential Type AUTOMATED        No results found.       HOSPITAL COURSE:    Patient is a 68 y.o. year old male history of alcohol dependency came to emergency room complaining of hematemesis and

## 2024-01-10 NOTE — PROGRESS NOTES
4 Eyes Skin Assessment     NAME:  George West  YOB: 1955  MEDICAL RECORD NUMBER:  [id number]    The patient is being assessed for  Other weekly    I agree that at least one RN has performed a thorough Head to Toe Skin Assessment on the patient. ALL assessment sites listed below have been assessed.      Areas assessed by both nurses:    Head, Face, Ears, Shoulders, Back, Chest, Arms, Elbows, Hands, Sacrum. Buttock, Coccyx, Ischium, Legs. Feet and Heels, and Under Medical Devices         Does the Patient have a Wound? No noted wound(s)       Vasyl Prevention initiated by RN: Yes  Wound Care Orders initiated by RN: No    Pressure Injury (Stage 3,4, Unstageable, DTI, NWPT, and Complex wounds) if present, place Wound referral order by RN under : No    New Ostomies, if present place, Ostomy referral order under : No     Nurse 1 eSignature: Electronically signed by Irish Verma RN on 1/10/24 at 1:22 AM EST    **SHARE this note so that the co-signing nurse can place an eSignature**    Nurse 2 eSignature: Electronically signed by Miguelina Heaton RN on 1/10/24 at 1:35 AM EST

## 2024-01-11 NOTE — CARE COORDINATION
0810: [facility] accepted patient with first available SOC 01/22/2024.    Agency was asked to reach out to the patient to arrange SOC.

## 2024-01-12 NOTE — PROGRESS NOTES
Physician Progress Note      PATIENT:               CONCETTA WHITMORE  CSN #:                  [id number]  :                       1955  ADMIT DATE:       2024 5:53 AM  DISCH DATE:        1/10/2024 4:39 PM  RESPONDING  PROVIDER #:        Brando Mccarty MD          QUERY TEXT:    Pt admitted with GIB. Pt noted to have ABLA. If possible, please document in   the progress notes and discharge summary if you are evaluating and/or treating   any of the following:    The medical record reflects the following:  Risk Factors: 68 year old male, upper GI bleed, hypotension, alcohol   dependency  Clinical Indicators: HGB: 2.5-7.8-6.8-7.5-6.8-5.7-7.3-6.4  BP: 83/67, 67/39, 80/48, 86/53, 82/50, 101/59  Treatment: Pt has received total of 8 units PRBC since admission, IVF NS bolus   x 3, ICU monitoring, emergent EGD with control of bleed    Thank you,  DEMARIO Chapman RN, CDS  [email or web address]  Options provided:  -- Hemorrhagic Shock  -- Hypovolemic Shock  -- Hypovolemia without Shock  -- Hypotension without Shock  -- Other - I will add my own diagnosis  -- Disagree - Not applicable / Not valid  -- Disagree - Clinically unable to determine / Unknown  -- Refer to Clinical Documentation Reviewer    PROVIDER RESPONSE TEXT:    This patient is in hemorrhagic shock.    Query created by: Jenae Chapman on 1/10/2024 4:29 PM      QUERY TEXT:    Patient admitted with GIB. Noted to have Severe Malnutrition in  Dietician   consult note. If possible, please document in progress notes and discharge   summary if you are evaluating and /or treating any of the following:      The medical record reflects the following:  Risk Factors: 68 year old male, upper GI bleed, gastritis, hematemesis, BMI   23.1, albumin 1.8, total protein 4.2, alcohol dependency  Clinical Indicators:  Dietician consult - \"Malnutrition Status:  Severe   malnutrition ([id number])  Context:  Acute Illness  Findings of the 6 clinical characteristics of

## 2024-08-15 ENCOUNTER — HOSPITAL ENCOUNTER (EMERGENCY)
Facility: HOSPITAL | Age: 69
Discharge: HOME | End: 2024-08-16
Attending: EMERGENCY MEDICINE
Payer: MEDICARE

## 2024-08-15 DIAGNOSIS — K29.00 ACUTE GASTRITIS WITHOUT HEMORRHAGE, UNSPECIFIED GASTRITIS TYPE: Primary | ICD-10-CM

## 2024-08-15 LAB
ALBUMIN SERPL-MCNC: 2.8 G/DL (ref 3.5–5)
ALBUMIN/GLOB SERPL: 0.7 (ref 1.1–2.2)
ALP SERPL-CCNC: 65 U/L (ref 45–117)
ALT SERPL-CCNC: 16 U/L (ref 12–78)
ANION GAP SERPL CALC-SCNC: 6 MMOL/L (ref 5–15)
AST SERPL W P-5'-P-CCNC: 12 U/L (ref 15–37)
BASOPHILS # BLD: 0 K/UL (ref 0–0.1)
BASOPHILS NFR BLD: 0 % (ref 0–1)
BILIRUB SERPL-MCNC: 0.2 MG/DL (ref 0.2–1)
BUN SERPL-MCNC: 20 MG/DL (ref 6–20)
BUN/CREAT SERPL: 17 (ref 12–20)
CA-I BLD-MCNC: 9.1 MG/DL (ref 8.5–10.1)
CHLORIDE SERPL-SCNC: 104 MMOL/L (ref 97–108)
CO2 SERPL-SCNC: 25 MMOL/L (ref 21–32)
CREAT SERPL-MCNC: 1.16 MG/DL (ref 0.7–1.3)
DIFFERENTIAL METHOD BLD: ABNORMAL
EOSINOPHIL # BLD: 0.1 K/UL (ref 0–0.4)
EOSINOPHIL NFR BLD: 1 % (ref 0–7)
ERYTHROCYTE [DISTWIDTH] IN BLOOD BY AUTOMATED COUNT: 15.8 % (ref 11.5–14.5)
GLOBULIN SER CALC-MCNC: 3.9 G/DL (ref 2–4)
GLUCOSE SERPL-MCNC: 109 MG/DL (ref 65–100)
HCT VFR BLD AUTO: 38.7 % (ref 36.6–50.3)
HGB BLD-MCNC: 12.9 G/DL (ref 12.1–17)
IMM GRANULOCYTES # BLD AUTO: 0 K/UL (ref 0–0.04)
IMM GRANULOCYTES NFR BLD AUTO: 0 % (ref 0–0.5)
LACTATE BLD-SCNC: 0.9 MMOL/L (ref 0.4–2)
LYMPHOCYTES # BLD: 1 K/UL (ref 0.8–3.5)
LYMPHOCYTES NFR BLD: 20 % (ref 12–49)
MCH RBC QN AUTO: 29.4 PG (ref 26–34)
MCHC RBC AUTO-ENTMCNC: 33.3 G/DL (ref 30–36.5)
MCV RBC AUTO: 88.2 FL (ref 80–99)
MONOCYTES # BLD: 0.4 K/UL (ref 0–1)
MONOCYTES NFR BLD: 8 % (ref 5–13)
NEUTS SEG # BLD: 3.6 K/UL (ref 1.8–8)
NEUTS SEG NFR BLD: 71 % (ref 32–75)
NRBC # BLD: 0 K/UL (ref 0–0.01)
NRBC BLD-RTO: 0 PER 100 WBC
PERFORMED BY:: NORMAL
PLATELET # BLD AUTO: 279 K/UL (ref 150–400)
PMV BLD AUTO: 9.2 FL (ref 8.9–12.9)
POTASSIUM SERPL-SCNC: 4.2 MMOL/L (ref 3.5–5.1)
PROT SERPL-MCNC: 6.7 G/DL (ref 6.4–8.2)
RBC # BLD AUTO: 4.39 M/UL (ref 4.1–5.7)
SODIUM SERPL-SCNC: 135 MMOL/L (ref 136–145)
TROPONIN I SERPL HS-MCNC: 4 NG/L (ref 0–76)
WBC # BLD AUTO: 5.2 K/UL (ref 4.1–11.1)

## 2024-08-15 PROCEDURE — 99284 EMERGENCY DEPT VISIT MOD MDM: CPT

## 2024-08-15 PROCEDURE — 36415 COLL VENOUS BLD VENIPUNCTURE: CPT

## 2024-08-15 PROCEDURE — 6370000000 HC RX 637 (ALT 250 FOR IP): Performed by: EMERGENCY MEDICINE

## 2024-08-15 PROCEDURE — 84484 ASSAY OF TROPONIN QUANT: CPT

## 2024-08-15 PROCEDURE — 85025 COMPLETE CBC W/AUTO DIFF WBC: CPT

## 2024-08-15 PROCEDURE — 83605 ASSAY OF LACTIC ACID: CPT

## 2024-08-15 PROCEDURE — 80053 COMPREHEN METABOLIC PANEL: CPT

## 2024-08-15 PROCEDURE — 93005 ELECTROCARDIOGRAM TRACING: CPT | Performed by: EMERGENCY MEDICINE

## 2024-08-15 RX ORDER — TRAMADOL HYDROCHLORIDE 50 MG/1
50 TABLET ORAL ONCE
Status: COMPLETED | OUTPATIENT
Start: 2024-08-15 | End: 2024-08-15

## 2024-08-15 RX ORDER — ONDANSETRON 4 MG/1
4 TABLET, ORALLY DISINTEGRATING ORAL 3 TIMES DAILY PRN
Qty: 21 TABLET | Refills: 0 | Status: SHIPPED | OUTPATIENT
Start: 2024-08-15

## 2024-08-15 RX ADMIN — TRAMADOL HYDROCHLORIDE 50 MG: 50 TABLET ORAL at 21:15

## 2024-08-15 ASSESSMENT — PAIN - FUNCTIONAL ASSESSMENT
PAIN_FUNCTIONAL_ASSESSMENT: 0-10
PAIN_FUNCTIONAL_ASSESSMENT: NONE - DENIES PAIN

## 2024-08-15 ASSESSMENT — PAIN DESCRIPTION - LOCATION
LOCATION: ABDOMEN
LOCATION: ABDOMEN

## 2024-08-15 ASSESSMENT — LIFESTYLE VARIABLES
HOW MANY STANDARD DRINKS CONTAINING ALCOHOL DO YOU HAVE ON A TYPICAL DAY: 1 OR 2
HOW OFTEN DO YOU HAVE A DRINK CONTAINING ALCOHOL: 2-3 TIMES A WEEK

## 2024-08-15 ASSESSMENT — PAIN SCALES - GENERAL
PAINLEVEL_OUTOF10: 7
PAINLEVEL_OUTOF10: 9

## 2024-08-15 NOTE — ED TRIAGE NOTES
Pt arrives via EMS states he started experiencing some dizziness, abdominal pain and nausea this morning.

## 2024-08-15 NOTE — ED PROVIDER NOTES
[facility] EMERGENCY DEPT  EMERGENCY DEPARTMENT HISTORY AND PHYSICAL EXAM      Date: 8/15/2024  Patient Name: George West  MRN: [id number]  Birthdate 1955  Date of evaluation: 8/15/2024  Provider: Ramon Guillaume MD   Note Started: 6:06 PM EDT 8/15/24    HISTORY OF PRESENT ILLNESS     Chief Complaint   Patient presents with    Dizziness       History Provided By: Patient    HPI: George West is a 68 y.o. male presents with a chief complaint of dizziness but says he had an upset stomach today with some associated nausea and vomiting.  Denies any diarrhea and denies any blood in his stool.  He says about 6 or 8 months ago he had a GI bleed and felt like some of the symptoms from then.  Denies any blood in his vomit has had no black tarry stools is not on a blood thinning medication.    He specifically denies any fever, chills, chest pain, shortness of breath, rash, headache, night sweats.    PAST MEDICAL HISTORY   Past Medical History:  Past Medical History:   Diagnosis Date    Asthma        Past Surgical History:  Past Surgical History:   Procedure Laterality Date    UPPER GASTROINTESTINAL ENDOSCOPY N/A 1/1/2024    EGD ESOPHAGOGASTRODUODENOSCOPY performed by Linh Olivier MD at [facility] ENDOSCOPY    UPPER GASTROINTESTINAL ENDOSCOPY N/A 1/1/2024    EGD BIOPSY performed by Linh Olivier MD at [facility] ENDOSCOPY    UPPER GASTROINTESTINAL ENDOSCOPY N/A 1/5/2024    EGD ESOPHAGOGASTRODUODENOSCOPY performed by Linh Olivier MD at [facility] ENDOSCOPY    UPPER GASTROINTESTINAL ENDOSCOPY N/A 1/5/2024    EGD CONTROL HEMORRHAGE performed by Linh Olivier MD at [facility] ENDOSCOPY       Family History:  History reviewed. No pertinent family history.    Social History:  Social History     Tobacco Use    Smoking status: Every Day     Current packs/day: 0.50     Types: Cigarettes    Smokeless tobacco: Never   Substance Use Topics    Alcohol use: Yes     Alcohol/week: 1.0 standard drink of alcohol     Types: 1 Cans of beer per week    Drug use: Never

## 2024-08-16 VITALS
TEMPERATURE: 98 F | DIASTOLIC BLOOD PRESSURE: 96 MMHG | HEIGHT: 71 IN | HEART RATE: 76 BPM | WEIGHT: 152 LBS | BODY MASS INDEX: 21.28 KG/M2 | RESPIRATION RATE: 18 BRPM | OXYGEN SATURATION: 100 % | SYSTOLIC BLOOD PRESSURE: 117 MMHG

## 2024-08-16 LAB
EKG ATRIAL RATE: 59 BPM
EKG DIAGNOSIS: NORMAL
EKG P AXIS: 24 DEGREES
EKG P-R INTERVAL: 110 MS
EKG Q-T INTERVAL: 376 MS
EKG QRS DURATION: 82 MS
EKG QTC CALCULATION (BAZETT): 372 MS
EKG R AXIS: 35 DEGREES
EKG T AXIS: 25 DEGREES
EKG VENTRICULAR RATE: 59 BPM

## 2024-08-16 NOTE — ED NOTES
Spoke with Dr. Guillaume.  States to place a case management consult for them to follow up with the patient tomorrow for assistance.  Patient good for discharge.
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
## (undated) DEVICE — HEMOSPRAY ENDOSCOPIC HEMOSTAT: Brand: HEMOSPRAY

## (undated) DEVICE — Device

## (undated) DEVICE — PROCEDURE KIT BX00717111

## (undated) DEVICE — NEEDLE SCLERO 23GA L240CM OD064MM ID032MM CLR INTERJECT

## (undated) DEVICE — FORCEPS BX 240CM 2.4MM L NDL RAD JAW 4 M00513334

## (undated) DEVICE — CANNULA NSL O2 AD 7 FT END-TIDAL CARBON DIOX VENTFLO